# Patient Record
Sex: FEMALE | Race: WHITE | Employment: OTHER | URBAN - METROPOLITAN AREA
[De-identification: names, ages, dates, MRNs, and addresses within clinical notes are randomized per-mention and may not be internally consistent; named-entity substitution may affect disease eponyms.]

---

## 2017-01-09 RX ORDER — PREDNISONE 10 MG/1
TABLET ORAL
Qty: 40 TABLET | Refills: 1 | Status: SHIPPED | OUTPATIENT
Start: 2017-01-09 | End: 2017-01-24 | Stop reason: ALTCHOICE

## 2017-01-24 ENCOUNTER — OFFICE VISIT (OUTPATIENT)
Dept: INTERNAL MEDICINE CLINIC | Facility: CLINIC | Age: 67
End: 2017-01-24

## 2017-01-24 VITALS
DIASTOLIC BLOOD PRESSURE: 80 MMHG | SYSTOLIC BLOOD PRESSURE: 120 MMHG | WEIGHT: 170 LBS | HEIGHT: 61.5 IN | BODY MASS INDEX: 31.69 KG/M2 | HEART RATE: 76 BPM | TEMPERATURE: 97 F

## 2017-01-24 DIAGNOSIS — E78.00 PURE HYPERCHOLESTEROLEMIA: ICD-10-CM

## 2017-01-24 DIAGNOSIS — R56.9 SEIZURE (HCC): ICD-10-CM

## 2017-01-24 DIAGNOSIS — I10 ESSENTIAL HYPERTENSION, BENIGN: Primary | ICD-10-CM

## 2017-01-24 PROCEDURE — 99214 OFFICE O/P EST MOD 30 MIN: CPT | Performed by: INTERNAL MEDICINE

## 2017-01-24 PROCEDURE — 99212 OFFICE O/P EST SF 10 MIN: CPT | Performed by: INTERNAL MEDICINE

## 2017-01-24 NOTE — PROGRESS NOTES
HPI:    Patient ID: Debo Guaman is a 78year old female. Hypertension  This is a chronic problem. The current episode started more than 1 year ago. The problem is unchanged. The problem is controlled.  Pertinent negatives include no anxiety, blurred v numbness and headaches.             Current Outpatient Prescriptions:  PROAIR  (90 BASE) MCG/ACT Inhalation Aero Soln INHALE 2 PUFFS BY MOUTH EVERY 4 TO 6 HOURS AS NEEDED FOR WHEEZING Disp: 8.5 Inhaler Rfl: 11   HYDROCHLOROTHIAZIDE 12.5 MG Oral Cap T heard.  Pulmonary/Chest: Effort normal and breath sounds normal. No respiratory distress. She has no wheezes. She has no rales. She exhibits no tenderness. Abdominal: She exhibits no distension and no mass. There is no tenderness.  There is no rebound and

## 2017-02-07 RX ORDER — LIDOCAINE 50 MG/G
PATCH TOPICAL
Qty: 30 PATCH | Refills: 11 | Status: SHIPPED | OUTPATIENT
Start: 2017-02-07 | End: 2017-12-01

## 2017-03-20 ENCOUNTER — TELEPHONE (OUTPATIENT)
Dept: INTERNAL MEDICINE CLINIC | Facility: CLINIC | Age: 67
End: 2017-03-20

## 2017-03-20 RX ORDER — AMOXICILLIN AND CLAVULANATE POTASSIUM 875; 125 MG/1; MG/1
1 TABLET, FILM COATED ORAL 2 TIMES DAILY
Qty: 20 TABLET | Refills: 0 | Status: SHIPPED | OUTPATIENT
Start: 2017-03-20 | End: 2017-12-26

## 2017-03-20 NOTE — TELEPHONE ENCOUNTER
After her vacation, legs were very sunburned and dry, she has been constantly scratching and developed a lot of  Red Raised  Bumps on both of her legs.  Is their any kind of antibiotic cream she can apply to get some relief ???      [address]

## 2017-05-01 RX ORDER — LEVOFLOXACIN 500 MG/1
TABLET, FILM COATED ORAL
Qty: 10 TABLET | Refills: 0 | Status: SHIPPED | OUTPATIENT
Start: 2017-05-01 | End: 2017-05-02 | Stop reason: ALTCHOICE

## 2017-05-01 RX ORDER — ALPRAZOLAM 0.25 MG/1
TABLET ORAL
Qty: 270 TABLET | Refills: 1 | Status: SHIPPED
Start: 2017-05-01 | End: 2017-05-11

## 2017-05-01 RX ORDER — PREDNISONE 10 MG/1
TABLET ORAL
Qty: 40 TABLET | Refills: 1 | Status: SHIPPED | OUTPATIENT
Start: 2017-05-01 | End: 2017-05-02 | Stop reason: ALTCHOICE

## 2017-05-02 ENCOUNTER — OFFICE VISIT (OUTPATIENT)
Dept: INTERNAL MEDICINE CLINIC | Facility: CLINIC | Age: 67
End: 2017-05-02

## 2017-05-02 VITALS
HEIGHT: 61.5 IN | HEART RATE: 64 BPM | TEMPERATURE: 97 F | DIASTOLIC BLOOD PRESSURE: 80 MMHG | SYSTOLIC BLOOD PRESSURE: 140 MMHG

## 2017-05-02 DIAGNOSIS — R56.9 SEIZURE (HCC): ICD-10-CM

## 2017-05-02 DIAGNOSIS — J44.1 CHRONIC OBSTRUCTIVE ASTHMA WITH EXACERBATION (HCC): ICD-10-CM

## 2017-05-02 DIAGNOSIS — E78.00 PURE HYPERCHOLESTEROLEMIA: ICD-10-CM

## 2017-05-02 DIAGNOSIS — J45.901 CHRONIC OBSTRUCTIVE ASTHMA WITH EXACERBATION (HCC): ICD-10-CM

## 2017-05-02 DIAGNOSIS — I10 ESSENTIAL HYPERTENSION, BENIGN: Primary | ICD-10-CM

## 2017-05-02 PROCEDURE — 36415 COLL VENOUS BLD VENIPUNCTURE: CPT | Performed by: INTERNAL MEDICINE

## 2017-05-02 PROCEDURE — 99214 OFFICE O/P EST MOD 30 MIN: CPT | Performed by: INTERNAL MEDICINE

## 2017-05-02 NOTE — PROGRESS NOTES
HPI:    Patient ID: Syl Oliveira is a 78year old female. Hypertension  This is a chronic problem. The current episode started more than 1 year ago. The problem is unchanged. The problem is controlled.  Pertinent negatives include no anxiety, blurred v asymmetry, weakness, light-headedness, numbness and headaches.             Current Outpatient Prescriptions:  ALPRAZOLAM 0.25 MG Oral Tab TAKE 1 TABLET BY MOUTH EVERY MORNING AND 2 TABS EVERY EVENING Disp: 270 tablet Rfl: 1   LIDOCAINE 5 % External Patch AP reveals no gallop. No murmur heard. Pulmonary/Chest: Effort normal and breath sounds normal. No respiratory distress. She has no wheezes. She has no rales. She exhibits no tenderness. Abdominal: She exhibits no distension and no mass.  There is no ten

## 2017-05-11 RX ORDER — ALPRAZOLAM 0.25 MG/1
TABLET ORAL
Qty: 270 TABLET | Refills: 1 | Status: SHIPPED
Start: 2017-05-11 | End: 2017-10-18

## 2017-05-11 NOTE — TELEPHONE ENCOUNTER
Patient did not  previous refill at [facility], confirmed with pharmacy

## 2017-05-22 ENCOUNTER — TELEPHONE (OUTPATIENT)
Dept: INTERNAL MEDICINE CLINIC | Facility: CLINIC | Age: 67
End: 2017-05-22

## 2017-05-22 RX ORDER — INHALER, ASSIST DEVICES
SPACER (EA) MISCELLANEOUS
Qty: 1 EACH | Refills: 0 | Status: SHIPPED | OUTPATIENT
Start: 2017-05-22 | End: 2017-07-05

## 2017-05-22 NOTE — TELEPHONE ENCOUNTER
Patient would like a spacer for her inhaler, she thinks it will help her with using the inhaler, she needs a prescription, send to [facility]

## 2017-06-12 RX ORDER — VENLAFAXINE HYDROCHLORIDE 150 MG/1
CAPSULE, EXTENDED RELEASE ORAL
Qty: 90 CAPSULE | Refills: 3 | Status: SHIPPED | OUTPATIENT
Start: 2017-06-12 | End: 2018-05-28

## 2017-06-19 RX ORDER — PHENYTOIN SODIUM 100 MG/1
CAPSULE, EXTENDED RELEASE ORAL
Qty: 270 CAPSULE | Refills: 3 | Status: SHIPPED | OUTPATIENT
Start: 2017-06-19 | End: 2018-05-28

## 2017-06-22 ENCOUNTER — TELEPHONE (OUTPATIENT)
Dept: INTERNAL MEDICINE CLINIC | Facility: CLINIC | Age: 67
End: 2017-06-22

## 2017-06-22 RX ORDER — PREDNISONE 10 MG/1
TABLET ORAL
Qty: 60 TABLET | Refills: 2 | Status: ON HOLD | OUTPATIENT
Start: 2017-06-22 | End: 2017-09-25

## 2017-07-06 RX ORDER — CLOPIDOGREL BISULFATE 75 MG/1
TABLET ORAL
Qty: 90 TABLET | Refills: 3 | Status: SHIPPED | OUTPATIENT
Start: 2017-07-06 | End: 2018-06-28

## 2017-07-06 RX ORDER — INHALER, ASSIST DEVICES
SPACER (EA) MISCELLANEOUS
Qty: 1 DEVICE | Refills: 0 | Status: SHIPPED | OUTPATIENT
Start: 2017-07-06 | End: 2017-12-26

## 2017-08-25 ENCOUNTER — OFFICE VISIT (OUTPATIENT)
Dept: INTERNAL MEDICINE CLINIC | Facility: CLINIC | Age: 67
End: 2017-08-25

## 2017-08-25 VITALS
DIASTOLIC BLOOD PRESSURE: 76 MMHG | HEIGHT: 61.5 IN | TEMPERATURE: 97 F | HEART RATE: 72 BPM | SYSTOLIC BLOOD PRESSURE: 130 MMHG

## 2017-08-25 DIAGNOSIS — J45.901 CHRONIC OBSTRUCTIVE ASTHMA WITH EXACERBATION (HCC): ICD-10-CM

## 2017-08-25 DIAGNOSIS — J44.1 CHRONIC OBSTRUCTIVE ASTHMA WITH EXACERBATION (HCC): ICD-10-CM

## 2017-08-25 DIAGNOSIS — R56.9 SEIZURE (HCC): ICD-10-CM

## 2017-08-25 DIAGNOSIS — E78.00 PURE HYPERCHOLESTEROLEMIA: ICD-10-CM

## 2017-08-25 DIAGNOSIS — M41.86 OTHER FORM OF SCOLIOSIS OF LUMBAR SPINE: ICD-10-CM

## 2017-08-25 DIAGNOSIS — I10 ESSENTIAL HYPERTENSION, BENIGN: Primary | ICD-10-CM

## 2017-08-25 PROBLEM — M41.9 SCOLIOSIS: Status: ACTIVE | Noted: 2017-08-25

## 2017-08-25 PROCEDURE — 99212 OFFICE O/P EST SF 10 MIN: CPT | Performed by: INTERNAL MEDICINE

## 2017-08-25 PROCEDURE — 36415 COLL VENOUS BLD VENIPUNCTURE: CPT | Performed by: INTERNAL MEDICINE

## 2017-08-25 PROCEDURE — 99214 OFFICE O/P EST MOD 30 MIN: CPT | Performed by: INTERNAL MEDICINE

## 2017-08-25 RX ORDER — MELOXICAM 15 MG/1
15 TABLET ORAL DAILY
Qty: 30 TABLET | Refills: 3 | Status: SHIPPED | OUTPATIENT
Start: 2017-08-25 | End: 2017-10-10

## 2017-08-25 NOTE — PROGRESS NOTES
HPI:    Patient ID: Isaac Manuel is a 75year old female. Hypertension   This is a chronic problem. The current episode started more than 1 year ago. The problem is unchanged. The problem is controlled.  Pertinent negatives include no anxiety, blurred stiffness. Allergic/Immunologic: Negative for immunocompromised state. Neurological: Negative for dizziness, syncope, facial asymmetry, weakness, light-headedness, numbness and headaches.             Current Outpatient Prescriptions:  Meloxicam 15 MG Or reaction(s): Anaphylaxis   PHYSICAL EXAM:   Physical Exam   Constitutional: She is oriented to person, place, and time. She appears well-developed.    obesity   HENT:   Mouth/Throat: Oropharynx is clear and moist.   Eyes: EOM are normal. Pupils are equal, r

## 2017-08-26 LAB
ABSOLUTE BASOPHILS: 31 CELLS/UL (ref 0–200)
ABSOLUTE EOSINOPHILS: 62 CELLS/UL (ref 15–500)
ABSOLUTE LYMPHOCYTES: 739 CELLS/UL (ref 850–3900)
ABSOLUTE MONOCYTES: 416 CELLS/UL (ref 200–950)
ABSOLUTE NEUTROPHILS: 6453 CELLS/UL (ref 1500–7800)
ALBUMIN/GLOBULIN RATIO: 1.6 (CALC) (ref 1–2.5)
ALBUMIN: 4.2 G/DL (ref 3.6–5.1)
ALKALINE PHOSPHATASE: 106 U/L (ref 33–130)
ALT: 17 U/L (ref 6–29)
AST: 14 U/L (ref 10–35)
BASOPHILS: 0.4 %
BILIRUBIN, TOTAL: 0.5 MG/DL (ref 0.2–1.2)
BUN: 15 MG/DL (ref 7–25)
CALCIUM: 9.6 MG/DL (ref 8.6–10.4)
CARBON DIOXIDE: 25 MMOL/L (ref 20–31)
CHLORIDE: 102 MMOL/L (ref 98–110)
CHOL/HDLC RATIO: 2.3 (CALC)
CHOLESTEROL, TOTAL: 249 MG/DL
CREATININE: 0.51 MG/DL (ref 0.5–0.99)
EGFR IF AFRICN AM: 115 ML/MIN/1.73M2
EGFR IF NONAFRICN AM: 99 ML/MIN/1.73M2
EOSINOPHILS: 0.8 %
GLOBULIN: 2.6 G/DL (CALC) (ref 1.9–3.7)
GLUCOSE: 102 MG/DL (ref 65–99)
HDL CHOLESTEROL: 106 MG/DL
HEMATOCRIT: 39.1 % (ref 35–45)
HEMOGLOBIN: 13.7 G/DL (ref 11.7–15.5)
LDL-CHOLESTEROL: 125 MG/DL (CALC)
LYMPHOCYTES: 9.6 %
MCH: 31.6 PG (ref 27–33)
MCHC: 35 G/DL (ref 32–36)
MCV: 90.1 FL (ref 80–100)
MONOCYTES: 5.4 %
MPV: 9.4 FL (ref 7.5–12.5)
NEUTROPHILS: 83.8 %
NON-HDL CHOLESTEROL: 143 MG/DL (CALC)
PHENYTOIN: 6.7 MG/L (ref 10–20)
PLATELET COUNT: 300 THOUSAND/UL (ref 140–400)
POTASSIUM: 4.2 MMOL/L (ref 3.5–5.3)
PROTEIN, TOTAL: 6.8 G/DL (ref 6.1–8.1)
RDW: 12.9 % (ref 11–15)
RED BLOOD CELL COUNT: 4.34 MILLION/UL (ref 3.8–5.1)
SODIUM: 138 MMOL/L (ref 135–146)
TRIGLYCERIDES: 85 MG/DL
WHITE BLOOD CELL COUNT: 7.7 THOUSAND/UL (ref 3.8–10.8)

## 2017-09-15 ENCOUNTER — TELEPHONE (OUTPATIENT)
Dept: INTERNAL MEDICINE CLINIC | Facility: CLINIC | Age: 67
End: 2017-09-15

## 2017-09-15 ENCOUNTER — OFFICE VISIT (OUTPATIENT)
Dept: INTERNAL MEDICINE CLINIC | Facility: CLINIC | Age: 67
End: 2017-09-15

## 2017-09-15 VITALS
SYSTOLIC BLOOD PRESSURE: 124 MMHG | TEMPERATURE: 98 F | HEART RATE: 72 BPM | HEIGHT: 61.5 IN | DIASTOLIC BLOOD PRESSURE: 70 MMHG

## 2017-09-15 DIAGNOSIS — J44.1 ACUTE EXACERBATION OF CHRONIC OBSTRUCTIVE PULMONARY DISEASE (COPD) (HCC): Primary | ICD-10-CM

## 2017-09-15 PROCEDURE — 99212 OFFICE O/P EST SF 10 MIN: CPT | Performed by: INTERNAL MEDICINE

## 2017-09-15 PROCEDURE — 99214 OFFICE O/P EST MOD 30 MIN: CPT | Performed by: INTERNAL MEDICINE

## 2017-09-15 RX ORDER — NEBULIZER ACCESSORIES
KIT MISCELLANEOUS
Qty: 1 KIT | Refills: 3 | Status: SHIPPED | OUTPATIENT
Start: 2017-09-15 | End: 2020-03-13

## 2017-09-15 RX ORDER — IPRATROPIUM BROMIDE AND ALBUTEROL SULFATE 2.5; .5 MG/3ML; MG/3ML
3 SOLUTION RESPIRATORY (INHALATION) EVERY 6 HOURS PRN
Qty: 360 VIAL | Refills: 3 | Status: SHIPPED | OUTPATIENT
Start: 2017-09-15 | End: 2017-09-29

## 2017-09-15 RX ORDER — MONTELUKAST SODIUM 10 MG/1
10 TABLET ORAL DAILY
Qty: 90 TABLET | Refills: 3 | Status: SHIPPED | OUTPATIENT
Start: 2017-09-15 | End: 2018-08-11

## 2017-09-15 NOTE — PROGRESS NOTES
HPI:    Patient ID: Lisa Tai is a 75year old female. Patient has been on Steroids since early August , was on 50 mg tapering every 4 days and now 40 mg tapering   She gets relief with proair 2 times a day .        Breathing Problem   She complains state.   Neurological: Negative for dizziness, syncope, facial asymmetry, weakness, light-headedness, numbness and headaches.             Current Outpatient Prescriptions:  Montelukast Sodium (SINGULAIR) 10 MG Oral Tab Take 1 tablet (10 mg total) by mouth d HOLDING CHAMBER Does not apply Device USE AS DIRECTED WITH PROAIR INHALER Disp: 1 Device Rfl: 0   PROAIR  (90 Base) MCG/ACT Inhalation Aero Soln INHALE 2 PUFFS BY MOUTH EVERY 4 TO 6 HOURS AS NEEDED Disp: 8.5 g Rfl: 11   LIDOCAINE 5 % External Patch Montelukast Sodium (SINGULAIR) 10 MG Oral Tab 90 tablet 3      Sig: Take 1 tablet (10 mg total) by mouth daily. Linaclotide (LINZESS) 145 MCG Oral Cap 30 capsule 5      Sig: Take 145 mcg by mouth daily.       ipratropium-albuterol 0.5-2.5 (3) MG/3ML In

## 2017-09-25 ENCOUNTER — APPOINTMENT (OUTPATIENT)
Dept: GENERAL RADIOLOGY | Facility: HOSPITAL | Age: 67
DRG: 192 | End: 2017-09-25
Attending: EMERGENCY MEDICINE
Payer: COMMERCIAL

## 2017-09-25 ENCOUNTER — HOSPITAL ENCOUNTER (INPATIENT)
Facility: HOSPITAL | Age: 67
LOS: 4 days | Discharge: HOME | DRG: 192 | End: 2017-09-29
Attending: EMERGENCY MEDICINE | Admitting: INTERNAL MEDICINE
Payer: COMMERCIAL

## 2017-09-25 DIAGNOSIS — J44.1 COPD EXACERBATION (HCC): Primary | ICD-10-CM

## 2017-09-25 PROCEDURE — 99223 1ST HOSP IP/OBS HIGH 75: CPT | Performed by: INTERNAL MEDICINE

## 2017-09-25 PROCEDURE — 71010 XR CHEST AP PORTABLE  (CPT=71010): CPT | Performed by: EMERGENCY MEDICINE

## 2017-09-25 RX ORDER — METHYLPREDNISOLONE SODIUM SUCCINATE 125 MG/2ML
125 INJECTION, POWDER, LYOPHILIZED, FOR SOLUTION INTRAMUSCULAR; INTRAVENOUS ONCE
Status: COMPLETED | OUTPATIENT
Start: 2017-09-25 | End: 2017-09-25

## 2017-09-25 RX ORDER — PHENYTOIN SODIUM 100 MG/1
100 CAPSULE, EXTENDED RELEASE ORAL 3 TIMES DAILY
Status: DISCONTINUED | OUTPATIENT
Start: 2017-09-25 | End: 2017-09-29

## 2017-09-25 RX ORDER — LIDOCAINE 50 MG/G
1 PATCH TOPICAL DAILY
Status: DISCONTINUED | OUTPATIENT
Start: 2017-09-25 | End: 2017-09-29

## 2017-09-25 RX ORDER — METHYLPREDNISOLONE SODIUM SUCCINATE 40 MG/ML
40 INJECTION, POWDER, LYOPHILIZED, FOR SOLUTION INTRAMUSCULAR; INTRAVENOUS EVERY 6 HOURS
Status: DISCONTINUED | OUTPATIENT
Start: 2017-09-25 | End: 2017-09-26

## 2017-09-25 RX ORDER — SODIUM CHLORIDE 9 MG/ML
INJECTION, SOLUTION INTRAVENOUS
Status: COMPLETED
Start: 2017-09-25 | End: 2017-09-25

## 2017-09-25 RX ORDER — ATORVASTATIN CALCIUM 20 MG/1
20 TABLET, FILM COATED ORAL NIGHTLY
Status: DISCONTINUED | OUTPATIENT
Start: 2017-09-25 | End: 2017-09-29

## 2017-09-25 RX ORDER — ACETAMINOPHEN 325 MG/1
650 TABLET ORAL EVERY 6 HOURS PRN
Status: DISCONTINUED | OUTPATIENT
Start: 2017-09-25 | End: 2017-09-29

## 2017-09-25 RX ORDER — IPRATROPIUM BROMIDE AND ALBUTEROL SULFATE 2.5; .5 MG/3ML; MG/3ML
3 SOLUTION RESPIRATORY (INHALATION)
Status: DISCONTINUED | OUTPATIENT
Start: 2017-09-25 | End: 2017-09-27

## 2017-09-25 RX ORDER — SODIUM CHLORIDE 0.9 % (FLUSH) 0.9 %
3 SYRINGE (ML) INJECTION AS NEEDED
Status: DISCONTINUED | OUTPATIENT
Start: 2017-09-25 | End: 2017-09-29

## 2017-09-25 RX ORDER — ALBUTEROL SULFATE 90 UG/1
2 AEROSOL, METERED RESPIRATORY (INHALATION) 4 TIMES DAILY
Status: DISCONTINUED | OUTPATIENT
Start: 2017-09-25 | End: 2017-09-25

## 2017-09-25 RX ORDER — VALSARTAN 320 MG/1
320 TABLET ORAL
Status: DISCONTINUED | OUTPATIENT
Start: 2017-09-26 | End: 2017-09-29

## 2017-09-25 RX ORDER — HYDRALAZINE HYDROCHLORIDE 20 MG/ML
10 INJECTION INTRAMUSCULAR; INTRAVENOUS EVERY 4 HOURS PRN
Status: DISCONTINUED | OUTPATIENT
Start: 2017-09-25 | End: 2017-09-29

## 2017-09-25 RX ORDER — ALBUTEROL SULFATE 90 UG/1
2 AEROSOL, METERED RESPIRATORY (INHALATION) EVERY 4 HOURS PRN
Status: DISCONTINUED | OUTPATIENT
Start: 2017-09-25 | End: 2017-09-29

## 2017-09-25 RX ORDER — HYDROCHLOROTHIAZIDE 12.5 MG/1
12.5 CAPSULE, GELATIN COATED ORAL
Status: DISCONTINUED | OUTPATIENT
Start: 2017-09-26 | End: 2017-09-29

## 2017-09-25 RX ORDER — VENLAFAXINE HYDROCHLORIDE 150 MG/1
150 CAPSULE, EXTENDED RELEASE ORAL
Status: DISCONTINUED | OUTPATIENT
Start: 2017-09-26 | End: 2017-09-29

## 2017-09-25 RX ORDER — MONTELUKAST SODIUM 10 MG/1
10 TABLET ORAL DAILY
Status: DISCONTINUED | OUTPATIENT
Start: 2017-09-25 | End: 2017-09-29

## 2017-09-25 RX ORDER — IPRATROPIUM BROMIDE AND ALBUTEROL SULFATE 2.5; .5 MG/3ML; MG/3ML
3 SOLUTION RESPIRATORY (INHALATION) ONCE
Status: COMPLETED | OUTPATIENT
Start: 2017-09-25 | End: 2017-09-25

## 2017-09-25 RX ORDER — CLOPIDOGREL BISULFATE 75 MG/1
75 TABLET ORAL
Status: DISCONTINUED | OUTPATIENT
Start: 2017-09-26 | End: 2017-09-29

## 2017-09-25 RX ORDER — 0.9 % SODIUM CHLORIDE 0.9 %
VIAL (ML) INJECTION
Status: DISPENSED
Start: 2017-09-25 | End: 2017-09-26

## 2017-09-25 RX ORDER — 0.9 % SODIUM CHLORIDE 0.9 %
VIAL (ML) INJECTION
Status: COMPLETED
Start: 2017-09-25 | End: 2017-09-25

## 2017-09-25 RX ORDER — IBUPROFEN 400 MG/1
800 TABLET ORAL 3 TIMES DAILY PRN
Status: DISCONTINUED | OUTPATIENT
Start: 2017-09-25 | End: 2017-09-29

## 2017-09-25 RX ORDER — CARVEDILOL 25 MG/1
25 TABLET ORAL 2 TIMES DAILY WITH MEALS
Status: DISCONTINUED | OUTPATIENT
Start: 2017-09-25 | End: 2017-09-29

## 2017-09-25 RX ORDER — HEPARIN SODIUM 5000 [USP'U]/ML
5000 INJECTION, SOLUTION INTRAVENOUS; SUBCUTANEOUS EVERY 12 HOURS SCHEDULED
Status: DISCONTINUED | OUTPATIENT
Start: 2017-09-25 | End: 2017-09-29

## 2017-09-25 RX ORDER — ALPRAZOLAM 0.25 MG/1
0.25 TABLET ORAL 2 TIMES DAILY PRN
Status: DISCONTINUED | OUTPATIENT
Start: 2017-09-25 | End: 2017-09-29

## 2017-09-25 NOTE — ED PROVIDER NOTES
Patient Seen in: [facility] AND [facility] Emergency Department     History   Patient presents with:  Dyspnea GUANACO SOB (respiratory)    Stated Complaint: sob    HPI    75year old female with a history of COPD on steroids for over a month now, having escalation of PHENYTOIN SODIUM EXTENDED 100 MG Oral Cap,  TAKE 1 CAPSULE BY MOUTH THREE TIMES DAILY   VENLAFAXINE HCL  MG Oral Capsule SR 24 Hr,  TAKE 1 CAPSULE BY MOUTH EVERY DAY   PROAIR  (90 Base) MCG/ACT Inhalation Aero Soln,  INHALE 2 PUFFS BY MOUTH EV (!) 88 %  O2 Device: None (Room air)    Current:/82   Pulse 74   Temp 98 °F (36.7 °C)   Resp 18   Ht 162.6 cm (5' 4\")   Wt 77.1 kg   SpO2 98%   BMI 29.18 kg/m²         Physical Exam   Constitutional: She is oriented to person, place, and time.  She i W/ DIFFERENTIAL - Abnormal; Notable for the following:     MCH 32.2 (*)     MPV 7.3 (*)     Lymphocyte Absolute 0.7 (*)     All other components within normal limits   CBC WITH DIFFERENTIAL WITH PLATELET    Narrative:      The following orders were created ED Medications Administered:   Medications   MethylPREDNISolone Sodium Succ (Solu-MEDROL) injection 125 mg (125 mg Intravenous Given 9/25/17 1009)   ipratropium-albuterol (DUONEB) nebulizer solution 3 mL (3 mL Nebulization Given 9/25/17 3401) Monitor Interpretation: normal sinus rhythm with a rate of 80    Oxygen Saturation: 98% on nasal cannula, abnormal: Hypoxic    Consults:         Consult to Family/Internal Medicine Once      IP consult to Pulmonology Once    MD Discussions or Sign-Ou

## 2017-09-25 NOTE — CONSULTS
[facility] HOSP - [facility]    Consult Note    Date:  9/25/2017  Date of Admission:  9/25/2017      Chief Complaint:   [place] Macias is a(n) 75year old female with dyspnea.     HPI:   The patient has a long-standing history of COPD having smoked 1 pack per 11/20/2014  Comment: 1 unit per day for 50 years  Alcohol use: Yes             Allergies/Medications: Allergies:   Lisinopril                  Comment:Other reaction(s): Cough  Shellfish Allergy           Comment:Other reaction(s):  Anaphylaxis    (Not in shot  7. To follow-up with pulmonary rehabilitation in the outpatient setting and patient will need a repeat spirometry prior    2. DVT prophylaxis–subcutaneous heparin    I am delighted to assist with Therese's care.         Chiki Conti MD  [facility]

## 2017-09-25 NOTE — ED INITIAL ASSESSMENT (HPI)
Patient here for c/o sob that started last night, took 2 nebs without relief. Hx COPD, SP02 88% on RA. Patient does not use home oxygen. Denies chest pain.

## 2017-09-26 PROCEDURE — 99222 1ST HOSP IP/OBS MODERATE 55: CPT | Performed by: INTERNAL MEDICINE

## 2017-09-26 PROCEDURE — 99232 SBSQ HOSP IP/OBS MODERATE 35: CPT | Performed by: INTERNAL MEDICINE

## 2017-09-26 RX ORDER — METHYLPREDNISOLONE SODIUM SUCCINATE 40 MG/ML
40 INJECTION, POWDER, LYOPHILIZED, FOR SOLUTION INTRAMUSCULAR; INTRAVENOUS EVERY 8 HOURS
Status: DISCONTINUED | OUTPATIENT
Start: 2017-09-26 | End: 2017-09-27

## 2017-09-26 RX ORDER — TRAMADOL HYDROCHLORIDE 50 MG/1
50 TABLET ORAL EVERY 6 HOURS PRN
Status: DISCONTINUED | OUTPATIENT
Start: 2017-09-26 | End: 2017-09-29

## 2017-09-26 NOTE — DISCHARGE PLANNING
9/26CM-MD orders received in regards to discharge planning-Patient will need home Oxygen.  This Writer to would leave a  DME Face to Face Form needs is in the Patient’s chart to be completed  and signed by the Patient’s MD and the oxygen sats need to be [place]

## 2017-09-26 NOTE — PAYOR COMM NOTE
--------------  ADMISSION REVIEW     Payor: [facility]  Subscriber #:  [id number]  Authorization Number: N/A    Admit date: 9/25/17  Admit time: 56       Admitting Physician: Dar Bang MD  Attending Physician:  Dar Bang MD  Primary Care Physic (NEBULIZER COMPRESSOR) Does not apply Kit,  Use every 6 hours as needed   Respiratory Therapy Supplies (NEBULIZER/TUBING/MOUTHPIECE) Does not apply Kit,  Use every 6 hours as needed   Meloxicam 15 MG Oral Tab,  Take 1 tablet (15 mg total) by mouth daily. expiration).             Emergency Differential Diagnosis: COPD exacerbation versus pneumothorax versus pneumonia    ED Course   Nursing notes and Triage vitals reviewed[JH.1]  Labs Reviewed   BASIC METABOLIC PANEL (8) - Abnormal; Notable for the following: hemidiaphragm. .  No significant pulmonary     parenchymal abnormalities. No effusion or pleural thickening. BONES: Moderately severe rotary thoracolumbar scoliosis. OTHER: Negative.            Dictated by (CST): Nadege Brown MD on 9/25/2017 at 10:0 obstructive pulmonary disease (COPD) ([facility] and COPD exacerbation ([facility] [address] on her problem list.[JH.2] These problems contribute to the complexity of this ED evaluation.     Radiology Interpretation: Radiology reports and images reviewed personally and are nega User    9/25/2017 7744 Given 0.25 mg Oral Albina Plascencia RN    9/25/2017 1509 Given 0.25 mg Oral Evelia Segal RN      atorvastatin (LIPITOR) tab 20 mg     Date Action Dose Route User    9/25/2017 2026 Given 20 mg Oral Mikal Crook RN carvedil 40 mg Intravenous Jen Conway RN    9/25/2017 1328 Given 40 mg Intravenous Jen Conway RN      Montelukast Sodium (SINGULAIR) tab 10 mg     Date Action Dose Route User    9/25/2017 1326 Given 10 mg Oral Jen Conway RN      Normal Saline Flush 0

## 2017-09-26 NOTE — PROGRESS NOTES
[facility]     Progress Note        Stacey Macias Patient Status:  Inpatient    1950 MRN [id number]   Location [facility] 3W/SW Attending Toshia White MD   Hosp Day # 1 PCP Modesta Maldonado MD       Subjective:   Patient seen tab 20 mg 20 mg Oral Nightly   valsartan (DIOVAN) tab 320 mg 320 mg Oral Daily   Venlafaxine HCl ER (EFFEXOR-XR) 24 hr cap 150 mg 150 mg Oral Daily   acetaminophen (TYLENOL) tab 650 mg 650 mg Oral Q6H PRN   hydrALAzine HCl (APRESOLINE) injection 10 mg 10 m observed  -Chest x-ray reviewed with no significant abnormality seen  -IV steroids wean. -ICS/LABA  -LAMA. Patient admits to using Spiriva at home but is unsure if she is using proper technique and concerned about drug delivery.   I recommended to the pat

## 2017-09-26 NOTE — H&P
[facility]    PATIENT'S NAME: MAY, [address]   ATTENDING PHYSICIAN: [place].  Jax Simpson MD   PATIENT ACCOUNT#:   [id number]    LOCATION:  [address] [place] [address] #:   [id number]       YOB: 1950  ADMISSION DATE:       09/25/2017 part-time but will be retiring from that job as well. FAMILY HISTORY:  Father had a CVA with dementia, hyperlipidemia, CHF,  in his mid 80s. Mother had cancer. She had a son who committed suicide.   Another son who had coronary artery disease, ob Patient is somewhat anxious. She is on IV steroids, not tearful, not overtly depressed. NEUROLOGIC:  Cranial nerves II-XII grossly intact. Patient ambulates without assistance. MUSCULOSKELETAL:  Strength 5/5 in all 4 extremities.   Deep tendon reflexes

## 2017-09-27 PROCEDURE — 99232 SBSQ HOSP IP/OBS MODERATE 35: CPT | Performed by: INTERNAL MEDICINE

## 2017-09-27 RX ORDER — PREDNISONE 20 MG/1
60 TABLET ORAL
Status: DISCONTINUED | OUTPATIENT
Start: 2017-09-28 | End: 2017-09-29

## 2017-09-27 RX ORDER — IPRATROPIUM BROMIDE AND ALBUTEROL SULFATE 2.5; .5 MG/3ML; MG/3ML
3 SOLUTION RESPIRATORY (INHALATION)
Status: DISCONTINUED | OUTPATIENT
Start: 2017-09-27 | End: 2017-09-29

## 2017-09-27 RX ORDER — IPRATROPIUM BROMIDE AND ALBUTEROL SULFATE 2.5; .5 MG/3ML; MG/3ML
3 SOLUTION RESPIRATORY (INHALATION) EVERY 6 HOURS PRN
Status: DISCONTINUED | OUTPATIENT
Start: 2017-09-27 | End: 2017-09-29

## 2017-09-27 NOTE — PROGRESS NOTES
[facility]    Progress Note    Sebas Johnstontrisha Macias Patient Status:  Inpatient    1950 MRN [id number]   Location [facility] 3W/SW Attending Gentry Brush MD   Hosp Day # 2 PCP Sarthak Pedro MD     Subjective:     Constitutional: murmur heard. Edema not present. Carotid bruit not present. Pulmonary/Chest: Effort normal. No respiratory distress. She has no wheezes. She has no rales. DIMINISHED BREATH SOUND NO AUDIBLE WHEEZING TODAY . Abdominal: Soft.  Bowel sounds are normal. -Left atrial enlargement. -ST depression + T-abnormality -Nondiagnostic.  ABNORMAL When compared with ECG of 08/26/2012 17:14:23 anterior T waves have changed, significant changes have occurred Electronically signed on 09/25/2017 at 10:01 by Karime Herr MD

## 2017-09-27 NOTE — PROGRESS NOTES
Pulmonary/Critical Care Follow Up Note    HPI:   Yaz Macias is a 75year old female with Patient presents with:  Dyspnea GUANACO SOB (respiratory)      PCP Bernard Cody MD  Admission Attending Sugey Aguilar 15 Day #2    No sob  No wheeze  Feel Clopidogrel Bisulfate (PLAVIX) tab 75 mg, 75 mg, Oral, Daily  •  hydrochlorothiazide (MICROZIDE) cap 12.5 mg, 12.5 mg, Oral, Daily  •  Phenytoin Sodium Extended (DILANTIN) ER cap 100 mg, 100 mg, Oral, TID  •  atorvastatin (LIPITOR) tab 20 mg, 20 mg, Oral, encouragement      Shelley Shahid MD

## 2017-09-27 NOTE — PLAN OF CARE
Patient/Family Goals    • Patient/Family Long Term Goal    RETURN TO NORMAL ACTIVITIES  Progressing    • Patient/Family Short Term Goal    OFF FROM OXYGEN Progressing        RESPIRATORY - ADULT    • Achieves optimal ventilation and oxygenation    OXYGEN 2L

## 2017-09-27 NOTE — PHYSICAL THERAPY NOTE
PHYSICAL THERAPY EVALUATION - INPATIENT     Room Number: 327/327-A  Evaluation Date: 9/27/2017  Type of Evaluation: Initial  Physician Order: PT Eval and Treat    Presenting Problem: COPD  Reason for Therapy: Mobility Dysfunction and Discharge Planning unspecified hyperlipidemia    • Other ill-defined conditions(799.89)     CEA 8/12   • Scoliosis     Severe   • Tobacco abuse    • Unspecified essential hypertension     SZ \"due to high blood pressure\", 2012       Past Surgical History  History reviewed. to a chair (including a wheelchair)?: None   -   Need to walk in hospital room?: A Little   -   Climbing 3-5 steps with a railing?: A Little     AM-PAC Score:  Raw Score: 22   PT Approx Degree of Impairment Score: 20.91%   Standardized Score (AM-PAC Scale)

## 2017-09-28 PROCEDURE — 99232 SBSQ HOSP IP/OBS MODERATE 35: CPT | Performed by: INTERNAL MEDICINE

## 2017-09-28 RX ORDER — DOCUSATE SODIUM 100 MG/1
100 CAPSULE, LIQUID FILLED ORAL 2 TIMES DAILY
Status: DISCONTINUED | OUTPATIENT
Start: 2017-09-28 | End: 2017-09-28

## 2017-09-28 RX ORDER — 0.9 % SODIUM CHLORIDE 0.9 %
VIAL (ML) INJECTION
Status: COMPLETED
Start: 2017-09-28 | End: 2017-09-28

## 2017-09-28 RX ORDER — PANTOPRAZOLE SODIUM 40 MG/1
40 TABLET, DELAYED RELEASE ORAL
Status: DISCONTINUED | OUTPATIENT
Start: 2017-09-28 | End: 2017-09-29

## 2017-09-28 RX ORDER — POTASSIUM CHLORIDE 20 MEQ/1
40 TABLET, EXTENDED RELEASE ORAL EVERY 4 HOURS
Status: COMPLETED | OUTPATIENT
Start: 2017-09-28 | End: 2017-09-28

## 2017-09-28 RX ORDER — CEFUROXIME AXETIL 500 MG/1
500 TABLET ORAL EVERY 12 HOURS SCHEDULED
Status: DISCONTINUED | OUTPATIENT
Start: 2017-09-28 | End: 2017-09-29

## 2017-09-28 RX ORDER — DOCUSATE SODIUM 100 MG/1
100 CAPSULE, LIQUID FILLED ORAL 2 TIMES DAILY
Status: DISCONTINUED | OUTPATIENT
Start: 2017-09-28 | End: 2017-09-29

## 2017-09-28 NOTE — PROGRESS NOTES
Pulmonary/Critical Care Follow Up Note    HPI:   Yaz Macias is a 75year old female with Patient presents with:  Dyspnea GUANACO SOB (respiratory)      PCP Bernard Cody MD  Admission Attending Bernard Coyd MD    Hospital Day #3      Anxious  Feels like n (COREG) tab 25 mg, 25 mg, Oral, BID with meals  •  Clopidogrel Bisulfate (PLAVIX) tab 75 mg, 75 mg, Oral, Daily  •  hydrochlorothiazide (MICROZIDE) cap 12.5 mg, 12.5 mg, Oral, Daily  •  Phenytoin Sodium Extended (DILANTIN) ER cap 100 mg, 100 mg, Oral, TID Prior nicotine dependence  Counseling and encouragement    Dispo- would likely tolerated d/c to home  However need to consider pt fear as will bouce back if fells like d/c too early      Jamar Regalado MD

## 2017-09-28 NOTE — PROGRESS NOTES
[facility]    Progress Note    Ebbie Skains May Patient Status:  Inpatient    1950 MRN [id number]   Location [facility] 3W/SW Attending Marilu Verma MD   Hosp Day # 3 PCP Le Meadows MD       SUBJECTIVE:  Breathing better. Oral Q6H PRN   Fluticasone Furoate-Vilanterol (BREO ELLIPTA) 100-25 MCG/INH inhaler 1 puff 1 puff Inhalation Daily   Montelukast Sodium (SINGULAIR) tab 10 mg 10 mg Oral Daily   Umeclidinium Bromide (INCRUSE ELLIPTA) 62.5 MCG/INH inhaler 1 puff 1 puff Inhal PO    GERD  Start PPI    Constipation  Bowel protocol. High fiber diet. Anxiety/Grief reaction  Still grieving from son's passing. HTN  BP controlled.     Home 02 eval    Seizure disorder  On Dilantin        Questions/concerns were discussed with diogenes

## 2017-09-28 NOTE — PLAN OF CARE
RESPIRATORY - ADULT    • Achieves optimal ventilation and oxygenation Not Progressing          Ambulatory O2 sats assessed for home oxygen need. On 2L at rest she is 97%. Pt ambulated approximately 30 feet on room air. O2 sats dropped to 86%.  Nasal cannula

## 2017-09-28 NOTE — PLAN OF CARE
Rested through the night, headache, prn tramadol given, oral steroid in the am. Will continue to monitor

## 2017-09-28 NOTE — PAYOR COMM NOTE
--------------  ADMISSION REVIEW     Payor: [facility]  Subscriber #:  [id number]  Authorization Number: [id number]    Admit date: 9/25/17  Admit time: [address]       Admitting Physician: Dar Bang MD  Attending Physician:  Dar Bang MD  Primary Care ipratropium-albuterol 0.5-2.5 (3) MG/3ML Inhalation Solution,  Take 3 mL by nebulization every 6 (six) hours as needed.    Respiratory Therapy Supplies (NEBULIZER COMPRESSOR) Does not apply Kit,  Use every 6 hours as needed   Respiratory Therapy Supplies ( Cancer-colon, Grandfather       Smoking status: Former Smoker                                                              Packs/day: 0.00      Years: 0.00         Types: Cigarettes     Quit date: 11/20/2014  Comment: 1 unit per day for 50 years  Alcohol Musculoskeletal: Normal range of motion. She exhibits no edema. Neurological: She is alert and oriented to person, place, and time. She displays no seizure activity. Skin: Skin is intact. No petechiae noted. She is not diaphoretic. No cyanosis.  No pall INDICATIONS: Shortness of breath x1 day. History of COPD and previous     smoker. TECHNIQUE:   Single view. FINDINGS:     CARDIAC/VASC: Moderate tortuosity ascending and descending thoracic     aorta. .  No cardiac silhouette abnormality Medical Record Review: I personally reviewed available prior medical records for any recent pertinent discharge summaries, testing, and procedures and reviewed those reports. Complicating Factors:  The patient already has[JH.1] has Chronic obstructive a COPD exacerbation ([facility] [address]  (primary encounter diagnosis)[JH.2]    Disposition:[JH.1]  There is no disposition on file for this visit. [JH.2]    Follow-up:[JH.1]  No follow-up provider specified. [JH.2]    Medications Prescribed:[JH.1]  Current Discharge [facility] HISTORY OF PRESENT ILLNESS:  She is a 79-year-old female with past medical history of COPD who had quit smoking approximately 2 years ago but smoked more than a pack a day for more than 50 years.   The patient stated that since August her breathing has been REVIEW OF SYSTEMS:  The patient complains of poor sleep, anxiety, depression, controlled on current medicines. She lives alone. Denies any visual changes, dry mouth, swallowing difficulties, neck pain, chest pain. No abdominal pain.   No nausea, vomiting LABORATORY DATA:  Sodium 131, potassium 3.6, chloride 96, bicarb 28, BUN 13, creatinine 0.58, blood sugar 116.  _______ 10, WBC 12.9, hemoglobin 13.9, hematocrit 41.1.       Chest x-ray showed some atelectasis, no overt pneumonia, and changes consistent wit CefTRIAXone Sodium (ROCEPHIN) 1 g in sodium chloride 0.9 % 100 mL IVPB-minibag/addvantage     Date Action Dose Route User    9/28/2017 1339 New Bag 1 g Intravenous Ifeoma Ramos, RN      Clopidogrel Bisulfate (PLAVIX) tab 75 mg     Date Action Dose Route 9/28/2017 0802 Given 50 mg Oral Melissa Hannater Nymirum    9/28/2017 0121 Given 50 mg Oral Naa Martell, RN      Umeclidinium Bromide (INCRUSE ELLIPTA) 62.5 MCG/INH inhaler 1 puff     Date Action Dose Route User    9/28/2017 5505 Given 1 puff I • CVA (cerebral infarction)       CVA by CT only   • Depression     • Esophageal reflux     • Hemorrhoids     • Hepatitis B 1993   • Other and unspecified hyperlipidemia     • Other ill-defined conditions(799.89)       CEA 8/12   • Scoliosis       Severe Extremities without clubbing cyanosis nor edema. Neurologic grossly intact with symmetric tone and strength and reflex.   Skin unremarkable     Results:      Lab Results  Component Value Date   WBC 8.4 09/25/2017   HGB 13.9 09/25/2017   HCT 39.8 09/25/201    Objective:   Blood pressure (!) 164/90, pulse 84, temperature 98.4 °F (36.9 °C), temperature source Oral, resp. rate 18, height 5' 4\" (1.626 m), weight 185 lb 9.6 oz (84.2 kg), SpO2 96 %.   Intake/Output:                 Last 3 shifts: I/O last 3 comple acetaminophen (TYLENOL) tab 650 mg 650 mg Oral Q6H PRN   Albuterol Sulfate  (90 Base) MCG/ACT inhaler 2 puff 2 puff Inhalation Q4H PRN   ibuprofen (MOTRIN) tab 800 mg 800 mg Oral TID PRN         Continuous Infusions:       Physical Exam  Constitutio -LAMA. Patient admits to using Spiriva at home but is unsure if she is using proper technique and concerned about drug delivery.   I recommended to the patient that she may try Spiriva Respimat when she needs to fill her next prescription.  -Empiric ceftri Neurological: Negative for dizziness, weakness, numbness and headaches. Psychiatric/Behavioral: Positive for depressed mood. Negative for behavioral problems, confusion and sleep disturbance. The patient is nervous/anxious.          PATIENT MORE ANXIOUS T PATIENT IS IMPROVING, STILL DECREASED AIR FLOW, PATIENT IS ANXIOUS AND THIS ALSO  PLAYS A ROLE WITH HER RESPIRATORY DISTRESS AT HOME. MORE ANXIOUS TODAY , STEROID EFFECT. TEARFUL. CONCERNED ABOUT GOING HOME AND HAVING THE SAME THING HAPPEN.    PATIENT HA Author: Jose Olson MD Service: Pulmonology Author Type: Physician   Filed: 9/27/2017  5:16 PM Date of Service: 9/27/2017  5:15 PM Status: Signed   : Jose Olson MD (Physician)   []Manual[]Template  []Copied  Pulmonary/Critical Care Follow Up •  CefTRIAXone Sodium (ROCEPHIN) 1 g in sodium chloride 0.9 % 100 mL IVPB-minibag/addvantage, 1 g, Intravenous, Q24H  •  lidocaine (LIDODERM) 5 % 1 patch, 1 patch, Transdermal, Daily  •  ALPRAZolam (XANAX) tab 0.25 mg, 0.25 mg, Oral, BID PRN  •  carvedilol CA 9.4 09/27/2017               ASSESSMENT/PLAN:      1.   COPD with acute exacerbation  Feeling better  Lung clear  Anxiety and panic may have played a role  Plan wean steroids, po in AM                 Nebs                 ICS/LABA                 LAMA •  predniSONE (DELTASONE) tab 60 mg, 60 mg, Oral, Daily with breakfast  •  TraMADol HCl (ULTRAM) tab 50 mg, 50 mg, Oral, Q6H PRN  •  Fluticasone Furoate-Vilanterol (BREO ELLIPTA) 100-25 MCG/INH inhaler 1 puff, 1 puff, Inhalation, Daily  •  Montelukast Sodi Blood pressure 129/82, pulse 73, temperature 98.9 °F (37.2 °C), temperature source Oral, resp.  rate 16, height 5' 4\" (1.626 m), weight 182 lb 6.4 oz (82.7 kg), SpO2 98 %.     Intake/Output Summary (Last 24 hours) at 09/28/17 3852  Last data filed at 09/28

## 2017-09-29 VITALS
BODY MASS INDEX: 31.27 KG/M2 | SYSTOLIC BLOOD PRESSURE: 117 MMHG | DIASTOLIC BLOOD PRESSURE: 81 MMHG | RESPIRATION RATE: 16 BRPM | WEIGHT: 183.19 LBS | OXYGEN SATURATION: 92 % | HEIGHT: 64 IN | TEMPERATURE: 98 F | HEART RATE: 80 BPM

## 2017-09-29 PROCEDURE — 99239 HOSP IP/OBS DSCHRG MGMT >30: CPT | Performed by: INTERNAL MEDICINE

## 2017-09-29 PROCEDURE — 99232 SBSQ HOSP IP/OBS MODERATE 35: CPT | Performed by: INTERNAL MEDICINE

## 2017-09-29 RX ORDER — CEFUROXIME AXETIL 500 MG/1
500 TABLET ORAL EVERY 12 HOURS SCHEDULED
Qty: 6 TABLET | Refills: 0 | Status: SHIPPED | OUTPATIENT
Start: 2017-09-29 | End: 2017-10-10 | Stop reason: ALTCHOICE

## 2017-09-29 RX ORDER — PANTOPRAZOLE SODIUM 40 MG/1
40 TABLET, DELAYED RELEASE ORAL
Qty: 30 TABLET | Refills: 0 | Status: SHIPPED | OUTPATIENT
Start: 2017-09-30 | End: 2017-10-23

## 2017-09-29 RX ORDER — PSEUDOEPHEDRINE HCL 30 MG
100 TABLET ORAL 2 TIMES DAILY PRN
Qty: 60 CAPSULE | Refills: 0 | Status: SHIPPED | OUTPATIENT
Start: 2017-09-29 | End: 2017-11-30

## 2017-09-29 NOTE — RESTORATIVE THERAPY
RESTORATIVE CARE TREATMENT NOTE    Presenting Problem  Presenting Problem: COPD          Precautions  Precautions: None       Weight Bearing Restriction  Weight Bearing Restriction: None                   SUNDAY MONDAY TUESDAY WEDNESDAY THURSDAY FRIDAY SAT

## 2017-09-29 NOTE — PROGRESS NOTES
O2 sat room air @ rest 93%  O2 sat room air ambulating 86%  O2 sa 1L @ rest 95%  O2 sat 1L ambulating 93%-95%

## 2017-09-29 NOTE — PROGRESS NOTES
[facility]    Progress Note    Gerri Ponce May Patient Status:  Inpatient    1950 MRN [id number]   Location [facility] 3W/SW Attending Naomi Shepherd MD   Hosp Day # 4 PCP Suzy Brooks MD       SUBJECTIVE:  Feeling well.  No solution 3 mL 3 mL Nebulization Q6H PRN   predniSONE (DELTASONE) tab 60 mg 60 mg Oral Daily with breakfast   TraMADol HCl (ULTRAM) tab 50 mg 50 mg Oral Q6H PRN   Fluticasone Furoate-Vilanterol (BREO ELLIPTA) 100-25 MCG/INH inhaler 1 puff 1 puff Inhalation Improved     GERD  Felt better with  PPI     Constipation  Had BM with colace.      Anxiety/Grief reaction  Doing better today     HTN  BP controlled.     Home 02      Seizure disorder  On Dilantin with control           Dispo: Discharge today    Sandrita Jones

## 2017-09-29 NOTE — PROGRESS NOTES
Pulmonary/Critical Care Follow Up Note    HPI:   Rodrigo Macias is a 75year old female with Patient presents with:  Dyspnea GUANACO SOB (respiratory)      PCP Nasreen Miller MD  Admission Attending Nasreen Miller MD    Hospital Day #4    Feeling better  Fees li PRN  •  carvedilol (COREG) tab 25 mg, 25 mg, Oral, BID with meals  •  Clopidogrel Bisulfate (PLAVIX) tab 75 mg, 75 mg, Oral, Daily  •  hydrochlorothiazide (MICROZIDE) cap 12.5 mg, 12.5 mg, Oral, Daily  •  Phenytoin Sodium Extended (DILANTIN) ER cap 100 mg, respiratory failure  Resolved    3.   Prior nicotine dependence  Counseling and encouragement    Rec f/u COPD clinic      Gail Haro MD

## 2017-09-29 NOTE — DISCHARGE PLANNING
Patient A/O. Denies any pain or SOB. Patient to be discharged to home with home oxygen. Discharge instructions included medications, prescriptions, side effects, oxygen, and follow up appointments. Reviewed with patient.  Patient verbalized understanding an

## 2017-10-02 ENCOUNTER — PATIENT OUTREACH (OUTPATIENT)
Dept: INTERNAL MEDICINE CLINIC | Facility: CLINIC | Age: 67
End: 2017-10-02

## 2017-10-02 ENCOUNTER — TELEPHONE (OUTPATIENT)
Dept: MEDSURG UNIT | Facility: HOSPITAL | Age: 67
End: 2017-10-02

## 2017-10-02 NOTE — PROGRESS NOTES
Initial Post Discharge Follow Up   Discharge Date: 9/29/17  Contact Date: 10/2/2017    Consent Verification:  Assessment Completed With: Patient  HIPAA Verified? Yes    1. Tell me why you were in the hospital?  I had difficulty breathing. I have COPD. Device USE AS DIRECTED WITH PROAIR INHALER Disp: 1 Device Rfl: 0   PHENYTOIN SODIUM EXTENDED 100 MG Oral Cap TAKE 1 CAPSULE BY MOUTH THREE TIMES DAILY Disp: 270 capsule Rfl: 3   VENLAFAXINE HCL  MG Oral Capsule SR 24 Hr TAKE 1 CAPSULE BY MOUTH EVERY 15. Do you have a follow up visit scheduled with your Primary Care Physician or Specialist?    Your appointments     Date & Time Appointment Department [facility])    Oct 06, 2017  9:00 AM KEVIN [address] Lacey with [facility] [place] [id number] /exacerbation of your COPD symptoms? I don't think they talked about that. Who is the physician managing your COPD?  Dr. Gavin Serrano    If you are experiencing a gradual worsening of your COPD symptoms, what instructions were you provided in regards to wh

## 2017-10-03 NOTE — DISCHARGE SUMMARY
[facility]    PATIENT'S NAME: MAY, [address]   ATTENDING PHYSICIAN: Syl Cody MD   PATIENT ACCOUNT#:   [id number]    LOCATION:  [address] [place] [address] #:   [id number]       YOB: 1950  ADMISSION DATE:       09/25/2017 condition. Rocephin was switched to cefuroxime p.o. She had constipation requiring stool softeners, and GERD improved with pantoprazole.     DISCHARGE MEDICATIONS:    Cefuroxime 500 mg b.i.d. for 3 more days, Colace 100 mg twice day, pantoprazole 40 mg da

## 2017-10-03 NOTE — PAYOR COMM NOTE
REQUESTING 2 ADDITIONAL DAYS FOR A TOTAL OF 4 INPATIENT DAYS  DISCHARGE REVIEW    Payor: IVONNE LINDQUIST  Subscriber #:  [id number]  Authorization Number: [id number]    Admit date: 9/25/17  Admit time:  1150  Discharge Date: 9/29/2017  3:57 PM     Admitting Isaac Psychiatric/Behavioral: Positive for depressed mood. Negative for behavioral problems, confusion and sleep disturbance. The patient is nervous/anxious.          PATIENT MORE ANXIOUS THAN USUAL, STEROID EFFECT.          Objective:   Blood pressure 141/71, pu BP IS CONTROLLED TODAY . Mainor Hamilton  PATIENT WAS UP WALKING IN THE HALLS , ENCOURAGED TO GET MORE EXERCISE.            Results:      Lab Results  Component Value Date   WBC 8.1 09/27/2017   HGB 13.5 09/27/2017   HCT 39.1 09/27/2017    09/27/2017   CREATSERUM Location [facility] 3W/SW Attending Vernon Young MD   Hosp Day # 3 PCP Lilli eHnsley MD         SUBJECTIVE:  Breathing better. Still feeling nervous. C/o heartburn. Constipated.  No CP or N/V.     OBJECTIVE:  Vital signs in last 24 hours:  / Fluticasone Furoate-Vilanterol (BREO ELLIPTA) 100-25 MCG/INH inhaler 1 puff 1 puff Inhalation Daily   Montelukast Sodium (SINGULAIR) tab 10 mg 10 mg Oral Daily   Umeclidinium Bromide (INCRUSE ELLIPTA) 62.5 MCG/INH inhaler 1 puff 1 puff Inhalation Daily   N    GERD  Start PPI     Constipation  Bowel protocol.  High fiber diet.     Anxiety/Grief reaction  Still grieving from son's passing.     HTN  BP controlled.     Home 02 eval     Seizure disorder  On Dilantin           Questions/concerns were discussed with WBC 6.9 09/29/2017   HGB 13.0 09/29/2017   HCT 38.6 09/29/2017    09/29/2017   CREATSERUM 0.43 09/29/2017   BUN 14 09/29/2017    09/29/2017   K 4.5 09/29/2017    09/29/2017   CO2 30 09/29/2017   GLU 99 09/29/2017   CA 9.4 09/29/2017   MG acetaminophen (TYLENOL) tab 650 mg 650 mg Oral Q6H PRN   Albuterol Sulfate  (90 Base) MCG/ACT inhaler 2 puff 2 puff Inhalation Q4H PRN   ibuprofen (MOTRIN) tab 800 mg 800 mg Oral TID PRN            Assessment  Patient Active Problem List:     Jay Herrera 3.   Gastroesophageal reflux disease. 4.   Seizure disorder. 5.   Constipation. HISTORY OF PRESENT ILLNESS:  This is a 23-year-old female admitted for shortness of breath. The patient has history of COPD with more than 50 pack-years of smoking. DISCHARGE MEDICATIONS:  She was discharged with the following medications:  Cefuroxime 500 mg b.i.d. for 3 more days, Colace 100 mg twice day, pantoprazole 40 mg daily, Spiriva Respimat 1 puff daily, clopidogrel 75 mg daily, Dilantin 100 mg 3 times a day,

## 2017-10-05 ENCOUNTER — TELEPHONE (OUTPATIENT)
Dept: INTERNAL MEDICINE CLINIC | Facility: CLINIC | Age: 67
End: 2017-10-05

## 2017-10-10 ENCOUNTER — OFFICE VISIT (OUTPATIENT)
Dept: INTERNAL MEDICINE CLINIC | Facility: CLINIC | Age: 67
End: 2017-10-10

## 2017-10-10 VITALS
SYSTOLIC BLOOD PRESSURE: 120 MMHG | WEIGHT: 183 LBS | HEIGHT: 61.5 IN | BODY MASS INDEX: 34.11 KG/M2 | TEMPERATURE: 97 F | HEART RATE: 64 BPM | DIASTOLIC BLOOD PRESSURE: 70 MMHG

## 2017-10-10 DIAGNOSIS — J44.1 ACUTE EXACERBATION OF CHRONIC OBSTRUCTIVE PULMONARY DISEASE (COPD) (HCC): Primary | ICD-10-CM

## 2017-10-10 DIAGNOSIS — I10 ESSENTIAL HYPERTENSION, BENIGN: ICD-10-CM

## 2017-10-10 PROCEDURE — 99495 TRANSJ CARE MGMT MOD F2F 14D: CPT | Performed by: INTERNAL MEDICINE

## 2017-10-10 RX ORDER — PEAK FLOW METER
EACH MISCELLANEOUS
Refills: 0 | COMMUNITY
Start: 2017-09-15 | End: 2020-03-13

## 2017-10-10 NOTE — PROGRESS NOTES
HPI:    Evelyn Macias is a 75year old female here today for hospital follow up.    She was discharged from Inpatient hospital, [facility]  to Home   Admission Date: 9/25/17   Discharge Date: 9/29/17  Hospital Discharge Diagnosis: No Value exists fo Take 100 mg by mouth 2 (two) times daily as needed for constipation.    Pantoprazole Sodium 40 MG Oral Tab EC Take 1 tablet (40 mg total) by mouth every morning before breakfast.   Tiotropium Bromide Monohydrate (SPIRIVA RESPIMAT) 2.5 MCG/ACT Inhalation Aer hyperlipidemia; Other ill-defined conditions(799.89); Scoliosis; Tobacco abuse; and Unspecified essential hypertension. She  has no past surgical history on file.     She family history includes Cancer in her mother and another family member; Hip surgery back is not tender, FROM of the extremities  EXTREMITIES: no cyanosis, clubbing or edema  NEURO: Oriented times three, cranial nerves are intact, motor and sensory are grossly intact    ASSESSMENT/ PLAN:   Essential hypertension, benign  Controlled on  Cur

## 2017-10-19 RX ORDER — ALPRAZOLAM 0.25 MG/1
TABLET ORAL
Qty: 270 TABLET | Refills: 1 | Status: SHIPPED
Start: 2017-10-19 | End: 2018-05-04

## 2017-10-23 RX ORDER — PANTOPRAZOLE SODIUM 40 MG/1
TABLET, DELAYED RELEASE ORAL
Qty: 30 TABLET | Refills: 11 | Status: SHIPPED | OUTPATIENT
Start: 2017-10-23 | End: 2017-12-18

## 2017-10-31 ENCOUNTER — NURSE ONLY (OUTPATIENT)
Dept: INTERNAL MEDICINE CLINIC | Facility: CLINIC | Age: 67
End: 2017-10-31

## 2017-10-31 DIAGNOSIS — J45.901 CHRONIC OBSTRUCTIVE ASTHMA WITH EXACERBATION (HCC): ICD-10-CM

## 2017-10-31 DIAGNOSIS — R56.9 SEIZURE (HCC): ICD-10-CM

## 2017-10-31 DIAGNOSIS — E78.00 PURE HYPERCHOLESTEROLEMIA: ICD-10-CM

## 2017-10-31 DIAGNOSIS — J44.1 CHRONIC OBSTRUCTIVE ASTHMA WITH EXACERBATION (HCC): ICD-10-CM

## 2017-10-31 DIAGNOSIS — I10 ESSENTIAL HYPERTENSION, BENIGN: Primary | ICD-10-CM

## 2017-10-31 PROCEDURE — 36415 COLL VENOUS BLD VENIPUNCTURE: CPT | Performed by: INTERNAL MEDICINE

## 2017-11-14 ENCOUNTER — TELEPHONE (OUTPATIENT)
Dept: INTERNAL MEDICINE CLINIC | Facility: CLINIC | Age: 67
End: 2017-11-14

## 2017-11-14 DIAGNOSIS — Z12.39 BREAST SCREENING: Primary | ICD-10-CM

## 2017-11-30 RX ORDER — PSEUDOEPHEDRINE HCL 30 MG
100 TABLET ORAL 2 TIMES DAILY PRN
Qty: 60 CAPSULE | Refills: 11 | Status: SHIPPED | OUTPATIENT
Start: 2017-11-30 | End: 2018-12-13

## 2017-12-02 RX ORDER — LIDOCAINE 50 MG/G
PATCH TOPICAL
Qty: 30 PATCH | Refills: 5 | Status: SHIPPED | OUTPATIENT
Start: 2017-12-02 | End: 2020-03-13

## 2017-12-11 ENCOUNTER — HOSPITAL ENCOUNTER (OUTPATIENT)
Dept: MAMMOGRAPHY | Age: 67
Discharge: HOME | End: 2017-12-11
Attending: INTERNAL MEDICINE
Payer: COMMERCIAL

## 2017-12-11 DIAGNOSIS — Z12.39 BREAST SCREENING: ICD-10-CM

## 2017-12-11 PROCEDURE — 77067 SCR MAMMO BI INCL CAD: CPT | Performed by: INTERNAL MEDICINE

## 2017-12-18 RX ORDER — PANTOPRAZOLE SODIUM 40 MG/1
TABLET, DELAYED RELEASE ORAL
Qty: 90 TABLET | Refills: 3 | Status: SHIPPED | OUTPATIENT
Start: 2017-12-18 | End: 2018-12-26

## 2017-12-26 RX ORDER — AMOXICILLIN AND CLAVULANATE POTASSIUM 875; 125 MG/1; MG/1
TABLET, FILM COATED ORAL
Qty: 20 TABLET | Refills: 0 | Status: SHIPPED | OUTPATIENT
Start: 2017-12-26 | End: 2018-02-08 | Stop reason: ALTCHOICE

## 2017-12-26 RX ORDER — INHALER, ASSIST DEVICES
SPACER (EA) MISCELLANEOUS
Qty: 1 DEVICE | Refills: 0 | Status: SHIPPED | OUTPATIENT
Start: 2017-12-26 | End: 2018-07-07

## 2017-12-26 RX ORDER — PREDNISONE 10 MG/1
TABLET ORAL
Qty: 60 TABLET | Refills: 1 | Status: SHIPPED | OUTPATIENT
Start: 2017-12-26 | End: 2018-02-08 | Stop reason: ALTCHOICE

## 2018-01-05 RX ORDER — SIMVASTATIN 40 MG
TABLET ORAL
Qty: 90 TABLET | Refills: 0 | Status: SHIPPED | OUTPATIENT
Start: 2018-01-05 | End: 2018-04-03

## 2018-01-05 RX ORDER — HYDROCHLOROTHIAZIDE 12.5 MG/1
CAPSULE, GELATIN COATED ORAL
Qty: 90 CAPSULE | Refills: 0 | Status: SHIPPED | OUTPATIENT
Start: 2018-01-05 | End: 2018-04-03

## 2018-01-15 RX ORDER — CARVEDILOL 25 MG/1
TABLET ORAL
Qty: 480 TABLET | Refills: 0 | Status: SHIPPED | OUTPATIENT
Start: 2018-01-15 | End: 2018-09-12

## 2018-01-15 RX ORDER — MOMETASONE FUROATE AND FORMOTEROL FUMARATE DIHYDRATE 200; 5 UG/1; UG/1
AEROSOL RESPIRATORY (INHALATION)
Qty: 39 G | Refills: 0 | Status: SHIPPED | OUTPATIENT
Start: 2018-01-15 | End: 2018-03-06

## 2018-02-08 ENCOUNTER — OFFICE VISIT (OUTPATIENT)
Dept: INTERNAL MEDICINE CLINIC | Facility: CLINIC | Age: 68
End: 2018-02-08

## 2018-02-08 VITALS
BODY MASS INDEX: 34.11 KG/M2 | SYSTOLIC BLOOD PRESSURE: 120 MMHG | WEIGHT: 183 LBS | TEMPERATURE: 98 F | DIASTOLIC BLOOD PRESSURE: 80 MMHG | HEART RATE: 76 BPM | HEIGHT: 61.5 IN

## 2018-02-08 DIAGNOSIS — J43.1 PANLOBULAR EMPHYSEMA (HCC): ICD-10-CM

## 2018-02-08 DIAGNOSIS — I10 ESSENTIAL HYPERTENSION, BENIGN: Primary | ICD-10-CM

## 2018-02-08 DIAGNOSIS — E78.00 PURE HYPERCHOLESTEROLEMIA: ICD-10-CM

## 2018-02-08 PROCEDURE — G0463 HOSPITAL OUTPT CLINIC VISIT: HCPCS | Performed by: INTERNAL MEDICINE

## 2018-02-08 PROCEDURE — 99214 OFFICE O/P EST MOD 30 MIN: CPT | Performed by: INTERNAL MEDICINE

## 2018-02-08 NOTE — PROGRESS NOTES
HPI:    Patient ID: Syl Oliveira is a 75year old female. Hypertension   This is a chronic problem. The current episode started more than 1 year ago. The problem is unchanged. The problem is controlled.  Pertinent negatives include no anxiety, blurred headaches.             Current Outpatient Prescriptions:  DULERA 200-5 MCG/ACT Inhalation Aerosol INHALE 2 PUFFS BY MOUTH TWICE DAILY Disp: 39 g Rfl: 0   CARVEDILOL 25 MG Oral Tab TAKE 1 TABLET BY MOUTH TWICE DAILY WITH FOOD Disp: 480 tablet Rfl: 0   HYDROC COMPRESSOR) Does not apply Kit Use every 6 hours as needed Disp: 1 kit Rfl: 0   Respiratory Therapy Supplies (NEBULIZER/TUBING/MOUTHPIECE) Does not apply Kit Use every 6 hours as needed Disp: 1 kit Rfl: 3     Allergies:  Lisinopril                  Comment

## 2018-02-12 RX ORDER — VALSARTAN 320 MG/1
TABLET ORAL
Qty: 90 TABLET | Refills: 3 | Status: SHIPPED | OUTPATIENT
Start: 2018-02-12 | End: 2018-08-09

## 2018-03-08 RX ORDER — MOMETASONE FUROATE AND FORMOTEROL FUMARATE DIHYDRATE 200; 5 UG/1; UG/1
AEROSOL RESPIRATORY (INHALATION)
Qty: 39 G | Refills: 11 | Status: SHIPPED | OUTPATIENT
Start: 2018-03-08 | End: 2019-05-25

## 2018-04-03 RX ORDER — SIMVASTATIN 40 MG
TABLET ORAL
Qty: 90 TABLET | Refills: 3 | Status: SHIPPED | OUTPATIENT
Start: 2018-04-03 | End: 2018-05-14 | Stop reason: ALTCHOICE

## 2018-04-03 RX ORDER — HYDROCHLOROTHIAZIDE 12.5 MG/1
CAPSULE, GELATIN COATED ORAL
Qty: 90 CAPSULE | Refills: 3 | Status: SHIPPED | OUTPATIENT
Start: 2018-04-03 | End: 2019-03-23

## 2018-04-03 NOTE — TELEPHONE ENCOUNTER
To Dr Eric Redd, was sent to IM/FM dept by mistake.    Thanks        Pending Prescriptions Disp Refills    HYDROCHLOROTHIAZIDE 12.5 MG Oral Cap [Pharmacy Med Name: HYDROCHLOROTHIAZIDE 12.5MG CAPSULES] 90 capsule 0     Sig: TAKE 1 CAPSULE BY MOUTH EVERY DAY      SIMVASTATIN 40 MG Oral Tab [Pharmacy Med Name: SIMVASTATIN 40MG TABLETS] 90 tablet 0     Sig: TAKE 1 TABLET BY MOUTH EVERY EVENING

## 2018-05-07 RX ORDER — ALPRAZOLAM 0.25 MG/1
TABLET ORAL
Qty: 270 TABLET | Refills: 1 | Status: SHIPPED
Start: 2018-05-07 | End: 2018-12-04

## 2018-05-11 ENCOUNTER — OFFICE VISIT (OUTPATIENT)
Dept: INTERNAL MEDICINE CLINIC | Facility: CLINIC | Age: 68
End: 2018-05-11

## 2018-05-11 ENCOUNTER — LAB ENCOUNTER (OUTPATIENT)
Dept: LAB | Age: 68
End: 2018-05-11
Attending: INTERNAL MEDICINE
Payer: COMMERCIAL

## 2018-05-11 VITALS
HEIGHT: 61.5 IN | DIASTOLIC BLOOD PRESSURE: 84 MMHG | SYSTOLIC BLOOD PRESSURE: 120 MMHG | WEIGHT: 183 LBS | TEMPERATURE: 98 F | HEART RATE: 68 BPM | BODY MASS INDEX: 34.11 KG/M2

## 2018-05-11 DIAGNOSIS — J43.1 PANLOBULAR EMPHYSEMA (HCC): ICD-10-CM

## 2018-05-11 DIAGNOSIS — E78.00 PURE HYPERCHOLESTEROLEMIA: ICD-10-CM

## 2018-05-11 DIAGNOSIS — R56.9 SEIZURE (HCC): ICD-10-CM

## 2018-05-11 DIAGNOSIS — E55.9 VITAMIN D DEFICIENCY: ICD-10-CM

## 2018-05-11 DIAGNOSIS — I10 ESSENTIAL HYPERTENSION, BENIGN: Primary | ICD-10-CM

## 2018-05-11 DIAGNOSIS — I10 ESSENTIAL HYPERTENSION, BENIGN: ICD-10-CM

## 2018-05-11 DIAGNOSIS — R73.9 HYPERGLYCEMIA: ICD-10-CM

## 2018-05-11 DIAGNOSIS — Z12.11 COLON CANCER SCREENING: ICD-10-CM

## 2018-05-11 PROCEDURE — 82652 VIT D 1 25-DIHYDROXY: CPT

## 2018-05-11 PROCEDURE — G0463 HOSPITAL OUTPT CLINIC VISIT: HCPCS | Performed by: INTERNAL MEDICINE

## 2018-05-11 PROCEDURE — 83036 HEMOGLOBIN GLYCOSYLATED A1C: CPT | Performed by: INTERNAL MEDICINE

## 2018-05-11 PROCEDURE — 36415 COLL VENOUS BLD VENIPUNCTURE: CPT | Performed by: INTERNAL MEDICINE

## 2018-05-11 PROCEDURE — 85025 COMPLETE CBC W/AUTO DIFF WBC: CPT

## 2018-05-11 PROCEDURE — 80053 COMPREHEN METABOLIC PANEL: CPT

## 2018-05-11 PROCEDURE — 80061 LIPID PANEL: CPT

## 2018-05-11 PROCEDURE — 99214 OFFICE O/P EST MOD 30 MIN: CPT | Performed by: INTERNAL MEDICINE

## 2018-05-11 PROCEDURE — 80185 ASSAY OF PHENYTOIN TOTAL: CPT

## 2018-05-11 NOTE — PROGRESS NOTES
HPI:    Patient ID: Isaac Manuel is a 75year old female. Hypertension   This is a chronic problem. The current episode started more than 1 year ago. The problem is unchanged. The problem is controlled.  Pertinent negatives include no anxiety, blurred genital sores, hematuria and urgency. Musculoskeletal: Negative for neck pain and neck stiffness. Allergic/Immunologic: Negative for immunocompromised state.    Neurological: Negative for dizziness, syncope, facial asymmetry, weakness, light-headedness, 90 capsule Rfl: 3   VALVED HOLDING CHAMBER Does not apply Device USE AS DIRECTED WITH PROAIR INHALER Disp: 1 Device Rfl: 0   Nebulizers (VIOS AEROSOL DELIVERY SYSTEM) Does not apply Misc U UTD Q 6 HOURS PRN Disp:  Rfl: 0   Respiratory Therapy Supplies (NEB Seizure ([facility] [address]  Dilantin level.      Vitamin D deficiency  vut D level          Orders Placed This Encounter      CBC W Differential W Platelet [E]      Comp Metabolic Panel (14) [E]      Hemoglobin A1C [E]      Lipid Panel [E]      Vitamin D, 1,25 Dihyd

## 2018-05-14 RX ORDER — ATORVASTATIN CALCIUM 40 MG/1
40 TABLET, FILM COATED ORAL NIGHTLY
Qty: 90 TABLET | Refills: 3 | Status: SHIPPED | OUTPATIENT
Start: 2018-05-14 | End: 2019-04-13

## 2018-05-22 ENCOUNTER — TELEPHONE (OUTPATIENT)
Dept: GASTROENTEROLOGY | Facility: CLINIC | Age: 68
End: 2018-05-22

## 2018-05-23 NOTE — TELEPHONE ENCOUNTER
Forwarded to [facility] PEBBLES--pt contacted and accepted appt with you on Wed 6/6 arrival 12:45pm and given directions to [facility] RAQUEL. The 9/24/09 report on your desk. Thanks.     Last Procedure:  Colonoscopy 9/24/09 for screening  Last Diagnosis:  Found adenomatous diann

## 2018-05-29 RX ORDER — VENLAFAXINE HYDROCHLORIDE 150 MG/1
CAPSULE, EXTENDED RELEASE ORAL
Qty: 90 CAPSULE | Refills: 3 | Status: SHIPPED | OUTPATIENT
Start: 2018-05-29 | End: 2019-04-26

## 2018-05-29 RX ORDER — PHENYTOIN SODIUM 100 MG/1
CAPSULE, EXTENDED RELEASE ORAL
Qty: 270 CAPSULE | Refills: 3 | Status: SHIPPED | OUTPATIENT
Start: 2018-05-29 | End: 2019-04-26

## 2018-05-31 NOTE — H&P
[address] [facility] [address] Gastroenterology                                                                                                  Clinic History and Physical     Pa endoscopies:  September 2009 colonoscopy performed by Dr. Kimberly Dela Cruz with [facility] r/t colon cancer screening, findings: Colon polyp ×2 (adenomatous), diverticulosis, internal hemorrhoids, 5 year recall    Social Hx:  + Former smoker 1 PPD ×50 years, quit 21 TAKE 1 CAPSULE BY MOUTH THREE TIMES DAILY Disp: 270 capsule Rfl: 3   atorvastatin 40 MG Oral Tab Take 1 tablet (40 mg total) by mouth nightly.  Disp: 90 tablet Rfl: 3   ALPRAZolam 0.25 MG Oral Tab TAKE 1 TABLET BY MOUTH EVERY MORNING AND 2 TABLETS BY MOUTH 3       Allergies:    Shellfish Allergy       ANAPHYLAXIS    Comment:Other reaction(s):  Anaphylaxis  Lisinopril              Coughing    Comment:Other reaction(s): Cough    ROS:   CONSTITUTIONAL:  negative for fevers, rigors  EYES:  negative for diplopia scoliosis, hypertension, depression, COPD, seizure disorder, who presents for colon cancer screening evaluation. 1.  History of adenomatous colon polyps: Physical exam unremarkable.   Patient is overdue for a colonoscopy recall due to her past history intervention [i.e. polypectomy, stent placement, etc.] as indicated. Orders This Visit:  No orders of the defined types were placed in this encounter.       Meds This Visit:    Signed Prescriptions Disp Refills    PEG 3350-KCl-NaBcb-NaCl-NaSulf (COLYTE

## 2018-06-06 ENCOUNTER — OFFICE VISIT (OUTPATIENT)
Dept: GASTROENTEROLOGY | Facility: CLINIC | Age: 68
End: 2018-06-06

## 2018-06-06 ENCOUNTER — TELEPHONE (OUTPATIENT)
Dept: GASTROENTEROLOGY | Facility: CLINIC | Age: 68
End: 2018-06-06

## 2018-06-06 VITALS
BODY MASS INDEX: 35.79 KG/M2 | SYSTOLIC BLOOD PRESSURE: 134 MMHG | HEART RATE: 60 BPM | WEIGHT: 192 LBS | HEIGHT: 61.5 IN | DIASTOLIC BLOOD PRESSURE: 81 MMHG

## 2018-06-06 DIAGNOSIS — K21.9 GASTROESOPHAGEAL REFLUX DISEASE, ESOPHAGITIS PRESENCE NOT SPECIFIED: ICD-10-CM

## 2018-06-06 DIAGNOSIS — Z80.0 FAMILY HISTORY OF COLON CANCER: ICD-10-CM

## 2018-06-06 DIAGNOSIS — Z86.010 HISTORY OF COLON POLYPS: Primary | ICD-10-CM

## 2018-06-06 DIAGNOSIS — Z86.010 HX OF ADENOMATOUS COLONIC POLYPS: Primary | ICD-10-CM

## 2018-06-06 DIAGNOSIS — Z80.0 FAMILY HISTORY OF ESOPHAGEAL CANCER: ICD-10-CM

## 2018-06-06 PROCEDURE — 99203 OFFICE O/P NEW LOW 30 MIN: CPT | Performed by: NURSE PRACTITIONER

## 2018-06-06 PROCEDURE — G0463 HOSPITAL OUTPT CLINIC VISIT: HCPCS | Performed by: NURSE PRACTITIONER

## 2018-06-06 NOTE — TELEPHONE ENCOUNTER
Scheduled for:  Colonoscopy 196[address]  Provider Name:   Date:  8/16/18  Location:  [facility]  Sedation:  Mac  Time:  0945 / Arrival 0845   Prep: Split dose Colyte  Meds/Allergies Reconciled?:  Physician reviewed by:PEBBLES Uribe  Diagnosis with codes:

## 2018-06-06 NOTE — PATIENT INSTRUCTIONS
1. Schedule colonoscopy with Dr. Kimberly Dela Cruz w/ [facility]    2.  bowel prep from pharmacy - split dose Colyte    3.  Continue all medications for procedure except Plavix (Please contact prescribing MD (Dr. Larry Jacinto) for specific recommendations including number

## 2018-06-28 RX ORDER — CLOPIDOGREL BISULFATE 75 MG/1
TABLET ORAL
Qty: 90 TABLET | Refills: 3 | Status: SHIPPED | OUTPATIENT
Start: 2018-06-28 | End: 2019-04-13

## 2018-07-09 RX ORDER — INHALER, ASSIST DEVICES
SPACER (EA) MISCELLANEOUS
Qty: 1 DEVICE | Refills: 0 | Status: SHIPPED | OUTPATIENT
Start: 2018-07-09 | End: 2020-03-13

## 2018-08-08 ENCOUNTER — TELEPHONE (OUTPATIENT)
Dept: INTERNAL MEDICINE CLINIC | Facility: CLINIC | Age: 68
End: 2018-08-08

## 2018-08-08 NOTE — TELEPHONE ENCOUNTER
Primitivo Veras Osmani needs a new medication to replace her Valsartan 320mg tablets which was recalled.  Pls fax [facility] a new prescription approval.

## 2018-08-09 ENCOUNTER — TELEPHONE (OUTPATIENT)
Dept: FAMILY MEDICINE CLINIC | Facility: CLINIC | Age: 68
End: 2018-08-09

## 2018-08-09 RX ORDER — IRBESARTAN 300 MG/1
300 TABLET ORAL NIGHTLY
Qty: 90 TABLET | Refills: 3 | Status: SHIPPED | OUTPATIENT
Start: 2018-08-09 | End: 2019-04-13

## 2018-08-10 ENCOUNTER — TELEPHONE (OUTPATIENT)
Dept: FAMILY MEDICINE CLINIC | Facility: CLINIC | Age: 68
End: 2018-08-10

## 2018-08-13 RX ORDER — MONTELUKAST SODIUM 10 MG/1
TABLET ORAL
Qty: 90 TABLET | Refills: 3 | Status: SHIPPED | OUTPATIENT
Start: 2018-08-13 | End: 2019-07-12

## 2018-08-14 ENCOUNTER — TELEPHONE (OUTPATIENT)
Dept: GASTROENTEROLOGY | Facility: CLINIC | Age: 68
End: 2018-08-14

## 2018-08-14 NOTE — TELEPHONE ENCOUNTER
I spoke to Tya Rico at endoscopy [facility] re below. She recommends that pt actually come to the blue lot, main entrance, as the  will be able to get her a wheelchair and contact volunteer services to take her to endoscopy.  Pt should bring a full tank of

## 2018-08-14 NOTE — TELEPHONE ENCOUNTER
Pt has cln hola 8/16 and requesting wheelchair and O2 access/availibality on this date, pls call at:[phone],thanks.   *Pt has copd

## 2018-08-16 ENCOUNTER — ANESTHESIA EVENT (OUTPATIENT)
Dept: ENDOSCOPY | Facility: HOSPITAL | Age: 68
End: 2018-08-16
Payer: MEDICARE

## 2018-08-16 ENCOUNTER — SURGERY (OUTPATIENT)
Age: 68
End: 2018-08-16

## 2018-08-16 ENCOUNTER — HOSPITAL ENCOUNTER (OUTPATIENT)
Facility: HOSPITAL | Age: 68
Setting detail: HOSPITAL OUTPATIENT SURGERY
Discharge: HOME | End: 2018-08-16
Attending: INTERNAL MEDICINE | Admitting: INTERNAL MEDICINE
Payer: MEDICARE

## 2018-08-16 ENCOUNTER — ANESTHESIA (OUTPATIENT)
Dept: ENDOSCOPY | Facility: HOSPITAL | Age: 68
End: 2018-08-16
Payer: MEDICARE

## 2018-08-16 VITALS
DIASTOLIC BLOOD PRESSURE: 95 MMHG | HEIGHT: 63 IN | WEIGHT: 190 LBS | RESPIRATION RATE: 15 BRPM | SYSTOLIC BLOOD PRESSURE: 180 MMHG | BODY MASS INDEX: 33.66 KG/M2 | HEART RATE: 68 BPM | OXYGEN SATURATION: 98 % | TEMPERATURE: 98 F

## 2018-08-16 DIAGNOSIS — Z86.010 HISTORY OF COLON POLYPS: ICD-10-CM

## 2018-08-16 DIAGNOSIS — Z80.0 FAMILY HISTORY OF COLON CANCER: ICD-10-CM

## 2018-08-16 PROCEDURE — 0DBP8ZX EXCISION OF RECTUM, VIA NATURAL OR ARTIFICIAL OPENING ENDOSCOPIC, DIAGNOSTIC: ICD-10-PCS | Performed by: INTERNAL MEDICINE

## 2018-08-16 PROCEDURE — 45385 COLONOSCOPY W/LESION REMOVAL: CPT | Performed by: INTERNAL MEDICINE

## 2018-08-16 PROCEDURE — 45380 COLONOSCOPY AND BIOPSY: CPT | Performed by: INTERNAL MEDICINE

## 2018-08-16 PROCEDURE — 0DBH8ZX EXCISION OF CECUM, VIA NATURAL OR ARTIFICIAL OPENING ENDOSCOPIC, DIAGNOSTIC: ICD-10-PCS | Performed by: INTERNAL MEDICINE

## 2018-08-16 RX ORDER — MORPHINE SULFATE 4 MG/ML
2 INJECTION, SOLUTION INTRAMUSCULAR; INTRAVENOUS EVERY 10 MIN PRN
Status: DISCONTINUED | OUTPATIENT
Start: 2018-08-16 | End: 2018-08-16

## 2018-08-16 RX ORDER — SODIUM CHLORIDE, SODIUM LACTATE, POTASSIUM CHLORIDE, CALCIUM CHLORIDE 600; 310; 30; 20 MG/100ML; MG/100ML; MG/100ML; MG/100ML
INJECTION, SOLUTION INTRAVENOUS CONTINUOUS
Status: DISCONTINUED | OUTPATIENT
Start: 2018-08-16 | End: 2018-08-16

## 2018-08-16 RX ORDER — MIDAZOLAM HYDROCHLORIDE 1 MG/ML
INJECTION INTRAMUSCULAR; INTRAVENOUS AS NEEDED
Status: DISCONTINUED | OUTPATIENT
Start: 2018-08-16 | End: 2018-08-16 | Stop reason: SURG

## 2018-08-16 RX ORDER — NALOXONE HYDROCHLORIDE 0.4 MG/ML
80 INJECTION, SOLUTION INTRAMUSCULAR; INTRAVENOUS; SUBCUTANEOUS AS NEEDED
Status: DISCONTINUED | OUTPATIENT
Start: 2018-08-16 | End: 2018-08-16

## 2018-08-16 RX ORDER — MORPHINE SULFATE 10 MG/ML
6 INJECTION, SOLUTION INTRAMUSCULAR; INTRAVENOUS EVERY 10 MIN PRN
Status: DISCONTINUED | OUTPATIENT
Start: 2018-08-16 | End: 2018-08-16

## 2018-08-16 RX ORDER — ONDANSETRON 2 MG/ML
4 INJECTION INTRAMUSCULAR; INTRAVENOUS ONCE AS NEEDED
Status: DISCONTINUED | OUTPATIENT
Start: 2018-08-16 | End: 2018-08-16

## 2018-08-16 RX ORDER — LIDOCAINE HYDROCHLORIDE 10 MG/ML
INJECTION, SOLUTION EPIDURAL; INFILTRATION; INTRACAUDAL; PERINEURAL AS NEEDED
Status: DISCONTINUED | OUTPATIENT
Start: 2018-08-16 | End: 2018-08-16 | Stop reason: SURG

## 2018-08-16 RX ORDER — HYDROCODONE BITARTRATE AND ACETAMINOPHEN 5; 325 MG/1; MG/1
1 TABLET ORAL AS NEEDED
Status: DISCONTINUED | OUTPATIENT
Start: 2018-08-16 | End: 2018-08-16

## 2018-08-16 RX ORDER — MORPHINE SULFATE 4 MG/ML
4 INJECTION, SOLUTION INTRAMUSCULAR; INTRAVENOUS EVERY 10 MIN PRN
Status: DISCONTINUED | OUTPATIENT
Start: 2018-08-16 | End: 2018-08-16

## 2018-08-16 RX ORDER — HYDROCODONE BITARTRATE AND ACETAMINOPHEN 5; 325 MG/1; MG/1
2 TABLET ORAL AS NEEDED
Status: DISCONTINUED | OUTPATIENT
Start: 2018-08-16 | End: 2018-08-16

## 2018-08-16 RX ORDER — HALOPERIDOL 5 MG/ML
0.25 INJECTION INTRAMUSCULAR ONCE AS NEEDED
Status: DISCONTINUED | OUTPATIENT
Start: 2018-08-16 | End: 2018-08-16

## 2018-08-16 RX ADMIN — SODIUM CHLORIDE, SODIUM LACTATE, POTASSIUM CHLORIDE, CALCIUM CHLORIDE: 600; 310; 30; 20 INJECTION, SOLUTION INTRAVENOUS at 09:45:00

## 2018-08-16 RX ADMIN — LIDOCAINE HYDROCHLORIDE 50 MG: 10 INJECTION, SOLUTION EPIDURAL; INFILTRATION; INTRACAUDAL; PERINEURAL at 10:02:00

## 2018-08-16 RX ADMIN — MIDAZOLAM HYDROCHLORIDE 2 MG: 1 INJECTION INTRAMUSCULAR; INTRAVENOUS at 10:00:00

## 2018-08-16 NOTE — ANESTHESIA POSTPROCEDURE EVALUATION
Patient: Ingrid Nieto May    Procedure Summary     Date:  08/16/18 Room / Location:  [facility] ENDOSCOPY 05 / [facility] ENDOSCOPY    Anesthesia Start:  1000 Anesthesia Stop:      Procedure:  COLONOSCOPY (N/A ) Diagnosis:       Family history of colon cancer      History

## 2018-08-16 NOTE — ANESTHESIA PREPROCEDURE EVALUATION
Anesthesia PreOp Note    HPI:     Homer Macias is a 70year old female who presents for preoperative consultation requested by: Yana Pierre MD    Date of Surgery: 8/16/2018    Procedure(s):  COLONOSCOPY  Indication: Family history of colon cancer, essential hypertension     SZ \"due to high blood pressure\", 2012       Past Surgical History:  2009: COLONOSCOPY  2008: EXPLORATION, CAROTID ARTERY      Prescriptions Prior to Admission:  MONTELUKAST SODIUM 10 MG Oral Tab TAKE 1 TABLET(10 MG) BY MOUTH DA (two) times daily as needed. Disp: 60 capsule Rfl: 11 Taking   Tiotropium Bromide Monohydrate (SPIRIVA RESPIMAT) 2.5 MCG/ACT Inhalation Aero Soln Inhale 1 puff into the lungs 2 (two) times daily.  Disp: 3 Inhaler Rfl: 3 Taking   Nebulizers (VIOS AEROSOL DEL 1.6 m (5' 3\")        Anesthesia ROS/Med Hx and Physical Exam     Patient summary reviewed and Nursing notes reviewed    Airway   Mallampati: II  TM distance: >3 FB  Neck ROM: full  Dental      Pulmonary - normal exam   (+) COPD mild, asthma, shortness of

## 2018-08-16 NOTE — OPERATIVE REPORT
[facility] HOSP - [facility] Endoscopy Report      Preoperative Diagnosis:  - history of colon polyps, last exam in 2009      Postoperative Diagnosis:  - colon polyps x 5   - diverticulosis  - internal hemorrhoids      Procedure:    Colonoscopy       Surgeon

## 2018-08-16 NOTE — H&P
History & Physical Examination    Patient Name: Todd Dumont  MRN: [id number]  CSN: [id number]  YOB: 1950    Diagnosis: history of colon polyps     Prescriptions Prior to Admission:  MONTELUKAST SODIUM 10 MG Oral Tab TAKE 1 TABLET(10 MG) BY mouth 2 (two) times daily as needed. Disp: 60 capsule Rfl: 11 Taking   Tiotropium Bromide Monohydrate (SPIRIVA RESPIMAT) 2.5 MCG/ACT Inhalation Aero Soln Inhale 1 puff into the lungs 2 (two) times daily.  Disp: 3 Inhaler Rfl: 3 Taking   Nebulizers (VIOS AER • Arthritis    • Colon polyp 2009   • COPD (chronic obstructive pulmonary disease) (HCC)    • CVA (cerebral infarction)     CVA by CT only   • Depression    • Esophageal reflux    • Hemorrhoids    • Hepatitis B 1993   • High blood pressure    • High chol

## 2018-08-21 ENCOUNTER — TELEPHONE (OUTPATIENT)
Dept: GASTROENTEROLOGY | Facility: CLINIC | Age: 68
End: 2018-08-21

## 2018-08-21 NOTE — TELEPHONE ENCOUNTER
Entered into EPIC:Recall colon in 3 years per Dr. Tracey Vora. Last Colon done 8/16/18, next due 8/16/21. Snapshot updated. Letter mailed.

## 2018-08-21 NOTE — TELEPHONE ENCOUNTER
----- Message from Wes Grady MD sent at 8/20/2018  6:33 PM CDT -----  I wanted to get back to you with your colonoscopy results. You had 5 colon polyps removed which were benign.   I would advise a repeat colonoscopy in 3 years to make sure no new p

## 2018-09-13 RX ORDER — CARVEDILOL 25 MG/1
TABLET ORAL
Qty: 180 TABLET | Refills: 1 | Status: SHIPPED | OUTPATIENT
Start: 2018-09-13 | End: 2019-03-05

## 2018-09-13 NOTE — TELEPHONE ENCOUNTER
No protocol associated in Rx folder, sent to provider    Hypertensive Medications  Protocol Criteria:  · Appointment scheduled in the past 6 months or in the next 3 months  · BMP or CMP in the past 12 months  · Creatinine result < 2  Recent Outpatient Visi

## 2018-09-24 RX ORDER — TIOTROPIUM BROMIDE INHALATION SPRAY 3.12 UG/1
SPRAY, METERED RESPIRATORY (INHALATION)
Qty: 12 G | Refills: 3 | Status: SHIPPED | OUTPATIENT
Start: 2018-09-24 | End: 2019-09-14

## 2018-10-12 ENCOUNTER — LAB ENCOUNTER (OUTPATIENT)
Dept: LAB | Age: 68
End: 2018-10-12
Attending: INTERNAL MEDICINE
Payer: MEDICARE

## 2018-10-12 ENCOUNTER — OFFICE VISIT (OUTPATIENT)
Dept: INTERNAL MEDICINE CLINIC | Facility: CLINIC | Age: 68
End: 2018-10-12
Payer: MEDICARE

## 2018-10-12 VITALS
SYSTOLIC BLOOD PRESSURE: 134 MMHG | TEMPERATURE: 98 F | DIASTOLIC BLOOD PRESSURE: 84 MMHG | HEIGHT: 61.5 IN | BODY MASS INDEX: 34.48 KG/M2 | WEIGHT: 185 LBS | HEART RATE: 68 BPM

## 2018-10-12 DIAGNOSIS — R56.9 SEIZURE (HCC): ICD-10-CM

## 2018-10-12 DIAGNOSIS — I10 ESSENTIAL HYPERTENSION, BENIGN: Primary | ICD-10-CM

## 2018-10-12 DIAGNOSIS — E78.00 PURE HYPERCHOLESTEROLEMIA: ICD-10-CM

## 2018-10-12 DIAGNOSIS — I10 ESSENTIAL HYPERTENSION, BENIGN: ICD-10-CM

## 2018-10-12 DIAGNOSIS — R73.9 HYPERGLYCEMIA: ICD-10-CM

## 2018-10-12 DIAGNOSIS — J43.1 PANLOBULAR EMPHYSEMA (HCC): ICD-10-CM

## 2018-10-12 PROCEDURE — 85025 COMPLETE CBC W/AUTO DIFF WBC: CPT

## 2018-10-12 PROCEDURE — 80053 COMPREHEN METABOLIC PANEL: CPT

## 2018-10-12 PROCEDURE — G0463 HOSPITAL OUTPT CLINIC VISIT: HCPCS | Performed by: INTERNAL MEDICINE

## 2018-10-12 PROCEDURE — G0008 ADMIN INFLUENZA VIRUS VAC: HCPCS | Performed by: INTERNAL MEDICINE

## 2018-10-12 PROCEDURE — 36415 COLL VENOUS BLD VENIPUNCTURE: CPT | Performed by: INTERNAL MEDICINE

## 2018-10-12 PROCEDURE — 90653 IIV ADJUVANT VACCINE IM: CPT | Performed by: INTERNAL MEDICINE

## 2018-10-12 PROCEDURE — 80185 ASSAY OF PHENYTOIN TOTAL: CPT

## 2018-10-12 PROCEDURE — 80061 LIPID PANEL: CPT

## 2018-10-12 PROCEDURE — 99214 OFFICE O/P EST MOD 30 MIN: CPT | Performed by: INTERNAL MEDICINE

## 2018-10-12 PROCEDURE — 83036 HEMOGLOBIN GLYCOSYLATED A1C: CPT | Performed by: INTERNAL MEDICINE

## 2018-10-12 NOTE — PROGRESS NOTES
HPI:    Patient ID: Kristian Carvajal is a 70year old female. Hypertension   This is a chronic problem. The current episode started more than 1 year ago. The problem is unchanged. The problem is controlled.  Pertinent negatives include no anxiety, blurred RESPIMAT 2.5 MCG/ACT Inhalation Aero Soln INHALE 1 PUFF INTO THE LUNGS TWICE DAILY Disp: 12 g Rfl: 3   CARVEDILOL 25 MG Oral Tab TAKE 1 TABLET BY MOUTH TWICE DAILY WITH FOOD Disp: 180 tablet Rfl: 1   MONTELUKAST SODIUM 10 MG Oral Tab TAKE 1 TABLET(10 MG) B Supplies (NEBULIZER/TUBING/MOUTHPIECE) Does not apply Kit Use every 6 hours as needed Disp: 1 kit Rfl: 3     Allergies:  Shellfish Allergy       ANAPHYLAXIS    Comment:Other reaction(s):  Anaphylaxis  Lisinopril              Coughing    Comment:Other reacti [[id number]]      Meds This Visit:  Requested Prescriptions      No prescriptions requested or ordered in this encounter       Imaging & Referrals:  FLU VACCINE ADJUVANT IM  PULMONARY - INTERNAL       AC#[id number]

## 2018-11-14 ENCOUNTER — OFFICE VISIT (OUTPATIENT)
Dept: PULMONOLOGY | Facility: CLINIC | Age: 68
End: 2018-11-14
Payer: MEDICARE

## 2018-11-14 VITALS
BODY MASS INDEX: 34.15 KG/M2 | WEIGHT: 200 LBS | RESPIRATION RATE: 18 BRPM | HEART RATE: 64 BPM | SYSTOLIC BLOOD PRESSURE: 174 MMHG | OXYGEN SATURATION: 100 % | HEIGHT: 64 IN | DIASTOLIC BLOOD PRESSURE: 93 MMHG

## 2018-11-14 DIAGNOSIS — J44.9 CHRONIC OBSTRUCTIVE PULMONARY DISEASE, UNSPECIFIED COPD TYPE (HCC): Primary | ICD-10-CM

## 2018-11-14 DIAGNOSIS — Z87.891 FORMER SMOKER: ICD-10-CM

## 2018-11-14 PROCEDURE — 99204 OFFICE O/P NEW MOD 45 MIN: CPT | Performed by: INTERNAL MEDICINE

## 2018-11-14 PROCEDURE — G0463 HOSPITAL OUTPT CLINIC VISIT: HCPCS | Performed by: INTERNAL MEDICINE

## 2018-11-14 NOTE — PROGRESS NOTES
[facility] MEDICAL OFFICE BUILDING, [facility]    Report of Consultation    Imelda Sides May Patient Status:  Outpatient    1950 MRN [id number]   Location [facility] Family History  Family History   Problem Relation Age of Onset   • Stroke Father         CVA   • Other (Hip surgery) Father    • Cancer Mother         Cancer-throat   • Cancer Other         Cancer-colon, Grandfather       Social History  Social Histo PFTs in 2014  )   Extensive history of smoking quit in 2013 (50-pack-year history of smoking )     Now stable / no evidence of exacerbation     2-chronic respiratory failure with hypoxemia on chronic home O2 therapy 2 L nasal cannula    3- screening for tadeo

## 2018-11-20 ENCOUNTER — TELEPHONE (OUTPATIENT)
Dept: PULMONOLOGY | Facility: CLINIC | Age: 68
End: 2018-11-20

## 2018-11-20 NOTE — TELEPHONE ENCOUNTER
Cover sheet stating no ambulatory ox done & office vis enctr from 11/14/18 faxed to Betty Burns @ [facility] at #[phone]. Conf rcvd.

## 2018-11-20 NOTE — TELEPHONE ENCOUNTER
Irais/PERNELL called to request face to face notes and oxygen saturations from pts 11/14/18 office visit. Please fax to [phone].

## 2018-12-04 NOTE — TELEPHONE ENCOUNTER
Therese needs a refill of:      ALPRAZolam 0.25 MG Oral Tab TAKE 1 TABLET BY MOUTH EVERY MORNING AND 2 TABLETS BY MOUTH EVERY EVENING Disp: 270 tablet Rfl: 1

## 2018-12-05 NOTE — TELEPHONE ENCOUNTER
No Protocol on this med.      Requested Prescriptions     Pending Prescriptions Disp Refills   • ALPRAZolam 0.25 MG Oral Tab 270 tablet 1     Sig: TAKE 1 TABLET BY MOUTH EVERY MORNING AND 2 TABLETS BY MOUTH EVERY EVENING       Last Office Visit with PCP: 10

## 2018-12-06 RX ORDER — ALPRAZOLAM 0.25 MG/1
TABLET ORAL
Qty: 270 TABLET | Refills: 1 | Status: SHIPPED
Start: 2018-12-06 | End: 2019-06-15

## 2018-12-06 RX ORDER — ALPRAZOLAM 0.25 MG/1
TABLET ORAL
Qty: 270 TABLET | Refills: 1 | OUTPATIENT
Start: 2018-12-06 | End: 2018-12-06

## 2018-12-13 RX ORDER — DOCUSATE SODIUM 100 MG/1
CAPSULE, LIQUID FILLED ORAL
Qty: 60 CAPSULE | Refills: 11 | Status: SHIPPED | OUTPATIENT
Start: 2018-12-13 | End: 2020-01-16

## 2018-12-27 RX ORDER — PANTOPRAZOLE SODIUM 40 MG/1
TABLET, DELAYED RELEASE ORAL
Qty: 90 TABLET | Refills: 0 | Status: SHIPPED | OUTPATIENT
Start: 2018-12-27 | End: 2019-03-20

## 2018-12-27 NOTE — TELEPHONE ENCOUNTER
Requested Prescriptions     Pending Prescriptions Disp Refills   • Pantoprazole Sodium 40 MG Oral Tab EC [Pharmacy Med Name: PANTOPRAZOLE 40MG TABLETS] 90 tablet 0     Sig: TAKE 1 TABLET BY MOUTH [address]       Last Office Visit w

## 2019-01-07 ENCOUNTER — TELEPHONE (OUTPATIENT)
Dept: FAMILY MEDICINE CLINIC | Facility: CLINIC | Age: 69
End: 2019-01-07

## 2019-01-07 DIAGNOSIS — Z12.39 BREAST SCREENING: Primary | ICD-10-CM

## 2019-01-15 ENCOUNTER — TELEPHONE (OUTPATIENT)
Dept: PULMONOLOGY | Facility: CLINIC | Age: 69
End: 2019-01-15

## 2019-01-15 ENCOUNTER — HOSPITAL ENCOUNTER (OUTPATIENT)
Dept: MAMMOGRAPHY | Age: 69
Discharge: HOME | End: 2019-01-15
Attending: INTERNAL MEDICINE
Payer: MEDICARE

## 2019-01-15 DIAGNOSIS — Z12.39 BREAST SCREENING: ICD-10-CM

## 2019-01-15 PROCEDURE — 77067 SCR MAMMO BI INCL CAD: CPT | Performed by: INTERNAL MEDICINE

## 2019-01-15 NOTE — TELEPHONE ENCOUNTER
Evelyn Coronel from [address] [place] [address] is calling to confirm and copies of the pts last oxygen test(oximitry) and office visit notes to bill medicare- fax number [phone]

## 2019-01-17 DIAGNOSIS — N60.11 BILATERAL FIBROCYSTIC BREAST CHANGES: Primary | ICD-10-CM

## 2019-01-17 DIAGNOSIS — N60.12 BILATERAL FIBROCYSTIC BREAST CHANGES: Primary | ICD-10-CM

## 2019-01-18 ENCOUNTER — TELEPHONE (OUTPATIENT)
Dept: PULMONOLOGY | Facility: CLINIC | Age: 69
End: 2019-01-18

## 2019-01-18 ENCOUNTER — OFFICE VISIT (OUTPATIENT)
Dept: INTERNAL MEDICINE CLINIC | Facility: CLINIC | Age: 69
End: 2019-01-18
Payer: MEDICARE

## 2019-01-18 VITALS
HEART RATE: 64 BPM | WEIGHT: 187 LBS | TEMPERATURE: 97 F | HEIGHT: 64 IN | DIASTOLIC BLOOD PRESSURE: 76 MMHG | BODY MASS INDEX: 31.92 KG/M2 | SYSTOLIC BLOOD PRESSURE: 120 MMHG

## 2019-01-18 DIAGNOSIS — I10 ESSENTIAL HYPERTENSION, BENIGN: Primary | ICD-10-CM

## 2019-01-18 DIAGNOSIS — J43.1 PANLOBULAR EMPHYSEMA (HCC): ICD-10-CM

## 2019-01-18 PROCEDURE — G0009 ADMIN PNEUMOCOCCAL VACCINE: HCPCS | Performed by: INTERNAL MEDICINE

## 2019-01-18 PROCEDURE — 99214 OFFICE O/P EST MOD 30 MIN: CPT | Performed by: INTERNAL MEDICINE

## 2019-01-18 PROCEDURE — G0463 HOSPITAL OUTPT CLINIC VISIT: HCPCS | Performed by: INTERNAL MEDICINE

## 2019-01-18 PROCEDURE — 90670 PCV13 VACCINE IM: CPT | Performed by: INTERNAL MEDICINE

## 2019-01-18 NOTE — TELEPHONE ENCOUNTER
Discussed below w/ Raghavendra Waller. Explained O2 sat was only taken while pt on O2 @ 2 L, so pt will need an appt if they need additional testing. She voiced understanding. Per Raghavendra Waller she will have pt call for an appt.  Confirmed that she has office notes from 11/14/1

## 2019-01-18 NOTE — PROGRESS NOTES
HPI:    Patient ID: Belgica Lara is a 70year old female. Hypertension   This is a chronic problem. The current episode started more than 1 year ago. The problem is unchanged. The problem is controlled.  Pertinent negatives include no anxiety, blurred light-headedness, numbness and headaches.             Current Outpatient Medications:  Pantoprazole Sodium 40 MG Oral Tab EC TAKE 1 TABLET BY MOUTH EVERY MORNING BEFORE BREAKFAST Disp: 90 tablet Rfl: 0    MG Oral Cap TAKE ONE CAPSULE BY MOUTH TWICE D Use every 6 hours as needed Disp: 1 kit Rfl: 0   Respiratory Therapy Supplies (NEBULIZER/TUBING/MOUTHPIECE) Does not apply Kit Use every 6 hours as needed Disp: 1 kit Rfl: 3     Allergies:  Shellfish Allergy       ANAPHYLAXIS    Comment:Other reaction(s):

## 2019-01-21 NOTE — TELEPHONE ENCOUNTER
Pls see TE 1/15/19. Pt requests appt since oximetry testing was not done during her last office visit. Sched pt 1/28/19 11:45 am [facility]. Appt info given. Reassured pt. She voiced understanding.

## 2019-01-28 ENCOUNTER — OFFICE VISIT (OUTPATIENT)
Dept: PULMONOLOGY | Facility: CLINIC | Age: 69
End: 2019-01-28
Payer: MEDICARE

## 2019-01-28 VITALS
OXYGEN SATURATION: 94 % | SYSTOLIC BLOOD PRESSURE: 140 MMHG | HEART RATE: 68 BPM | WEIGHT: 183 LBS | HEIGHT: 64 IN | DIASTOLIC BLOOD PRESSURE: 82 MMHG | BODY MASS INDEX: 31.24 KG/M2

## 2019-01-28 DIAGNOSIS — J96.11 CHRONIC RESPIRATORY FAILURE WITH HYPOXIA (HCC): ICD-10-CM

## 2019-01-28 DIAGNOSIS — J44.9 CHRONIC OBSTRUCTIVE PULMONARY DISEASE, UNSPECIFIED COPD TYPE (HCC): Primary | ICD-10-CM

## 2019-01-28 PROCEDURE — G0463 HOSPITAL OUTPT CLINIC VISIT: HCPCS | Performed by: INTERNAL MEDICINE

## 2019-01-28 PROCEDURE — 99214 OFFICE O/P EST MOD 30 MIN: CPT | Performed by: INTERNAL MEDICINE

## 2019-01-28 PROCEDURE — 94761 N-INVAS EAR/PLS OXIMETRY MLT: CPT | Performed by: INTERNAL MEDICINE

## 2019-01-28 NOTE — PROGRESS NOTES
HPI:    Patient ID: Belgica Lara is a 70year old female. HPI  Doing well   No sob more then baseline   No cough  No sputum   No cp   Better with home O2 use   Review of Systems   Constitutional: Negative for fatigue and fever.    Respiratory: Positive LIDOCAINE 5 % External Patch APPLY 1 PATCH EXTERNALLY TO THE SKIN EVERY DAY Disp: 30 patch Rfl: 5   Nebulizers (VIOS AEROSOL DELIVERY SYSTEM) Does not apply Misc U UTD Q 6 HOURS PRN Disp:  Rfl: 0   Respiratory Therapy Supplies (NEBULIZER COMPRESSOR) Does No prescriptions requested or ordered in this encounter       Imaging & Referrals:  None       LD#[id number]

## 2019-01-29 ENCOUNTER — HOSPITAL ENCOUNTER (OUTPATIENT)
Dept: MAMMOGRAPHY | Facility: HOSPITAL | Age: 69
Discharge: HOME | End: 2019-01-29
Attending: INTERNAL MEDICINE
Payer: MEDICARE

## 2019-01-29 DIAGNOSIS — N60.11 BILATERAL FIBROCYSTIC BREAST CHANGES: ICD-10-CM

## 2019-01-29 DIAGNOSIS — N60.12 BILATERAL FIBROCYSTIC BREAST CHANGES: ICD-10-CM

## 2019-01-29 PROCEDURE — 77062 BREAST TOMOSYNTHESIS BI: CPT | Performed by: INTERNAL MEDICINE

## 2019-01-29 PROCEDURE — 77066 DX MAMMO INCL CAD BI: CPT | Performed by: INTERNAL MEDICINE

## 2019-03-06 RX ORDER — CARVEDILOL 25 MG/1
TABLET ORAL
Qty: 180 TABLET | Refills: 3 | Status: SHIPPED | OUTPATIENT
Start: 2019-03-06 | End: 2020-01-17

## 2019-03-21 ENCOUNTER — TELEPHONE (OUTPATIENT)
Dept: PULMONOLOGY | Facility: CLINIC | Age: 69
End: 2019-03-21

## 2019-03-21 NOTE — TELEPHONE ENCOUNTER
Pt is overdue for chest CT. Letter sent 1.14.19. Called the patient. LDM with scheduling instructions.

## 2019-03-21 NOTE — TELEPHONE ENCOUNTER
Sent to on call as DW out of town and instructions to send to on call     Review pended refill request as it does not fall under a protocol.     Last Rx: 12-27-18  LOV: 1-18-19

## 2019-03-23 NOTE — TELEPHONE ENCOUNTER
Dr Curtis Peter for Dr eMlissa Hsu, please.  Please advise on refill request.    Hypertensive Medications  Protocol Criteria:  · Appointment scheduled in the past 6 months or in the next 3 months  · BMP or CMP in the past 12 months  · Creatinine result < 2  Recent

## 2019-03-24 RX ORDER — HYDROCHLOROTHIAZIDE 12.5 MG/1
CAPSULE, GELATIN COATED ORAL
Qty: 90 CAPSULE | Refills: 1 | Status: SHIPPED | OUTPATIENT
Start: 2019-03-24 | End: 2019-07-12

## 2019-03-25 RX ORDER — PANTOPRAZOLE SODIUM 40 MG/1
TABLET, DELAYED RELEASE ORAL
Qty: 90 TABLET | Refills: 3 | Status: SHIPPED | OUTPATIENT
Start: 2019-03-25 | End: 2020-03-15

## 2019-03-26 ENCOUNTER — TELEPHONE (OUTPATIENT)
Dept: FAMILY MEDICINE CLINIC | Facility: CLINIC | Age: 69
End: 2019-03-26

## 2019-03-26 RX ORDER — HYDROXYZINE HYDROCHLORIDE 25 MG/1
25 TABLET, FILM COATED ORAL 3 TIMES DAILY PRN
Qty: 30 TABLET | Refills: 3 | Status: SHIPPED | OUTPATIENT
Start: 2019-03-26 | End: 2019-05-09

## 2019-03-26 NOTE — TELEPHONE ENCOUNTER
Patient has complaints of itching all over, is bruised from her blood thinner from scratching, offered her an appt, but refused, just wants a prescription sent to pharmacy.       [facility]

## 2019-04-03 ENCOUNTER — HOSPITAL ENCOUNTER (OUTPATIENT)
Dept: CT IMAGING | Facility: HOSPITAL | Age: 69
Discharge: HOME | End: 2019-04-03
Attending: INTERNAL MEDICINE
Payer: MEDICARE

## 2019-04-03 ENCOUNTER — HOSPITAL ENCOUNTER (OUTPATIENT)
Dept: RESPIRATORY THERAPY | Facility: HOSPITAL | Age: 69
Discharge: HOME | End: 2019-04-03
Attending: INTERNAL MEDICINE
Payer: MEDICARE

## 2019-04-03 DIAGNOSIS — J44.9 CHRONIC OBSTRUCTIVE PULMONARY DISEASE, UNSPECIFIED COPD TYPE (HCC): ICD-10-CM

## 2019-04-03 DIAGNOSIS — Z87.891 FORMER SMOKER: ICD-10-CM

## 2019-04-03 PROCEDURE — 94726 PLETHYSMOGRAPHY LUNG VOLUMES: CPT | Performed by: INTERNAL MEDICINE

## 2019-04-03 PROCEDURE — 94729 DIFFUSING CAPACITY: CPT | Performed by: INTERNAL MEDICINE

## 2019-04-03 PROCEDURE — 94060 EVALUATION OF WHEEZING: CPT | Performed by: INTERNAL MEDICINE

## 2019-04-13 DIAGNOSIS — R91.1 LUNG NODULE: Primary | ICD-10-CM

## 2019-04-14 RX ORDER — CLOPIDOGREL BISULFATE 75 MG/1
TABLET ORAL
Qty: 90 TABLET | Refills: 3 | Status: SHIPPED | OUTPATIENT
Start: 2019-04-14 | End: 2020-05-15

## 2019-04-14 RX ORDER — ATORVASTATIN CALCIUM 40 MG/1
TABLET, FILM COATED ORAL
Qty: 90 TABLET | Refills: 0 | Status: SHIPPED | OUTPATIENT
Start: 2019-04-14 | End: 2019-07-12

## 2019-04-14 RX ORDER — IRBESARTAN 300 MG/1
TABLET ORAL
Qty: 90 TABLET | Refills: 0 | Status: SHIPPED | OUTPATIENT
Start: 2019-04-14 | End: 2019-10-29

## 2019-04-15 NOTE — PROCEDURES
[facility]    PFT Interpretation    Deward Labor May    1950 MRN [id number]   Height  590 Age 70year old   Weight  183 lbs  Sex Female         Spirometry:     FEV1 1.01 L which is 45%  FEV1/FVC 45% which is severely reduced    No

## 2019-04-15 NOTE — ADDENDUM NOTE
Encounter addended by: Isiah Talamantes MD on: 4/15/2019 6:13 PM   Actions taken: Sign clinical note, Charge Capture section accepted

## 2019-04-26 ENCOUNTER — OFFICE VISIT (OUTPATIENT)
Dept: INTERNAL MEDICINE CLINIC | Facility: CLINIC | Age: 69
End: 2019-04-26
Payer: MEDICARE

## 2019-04-26 ENCOUNTER — LAB ENCOUNTER (OUTPATIENT)
Dept: LAB | Age: 69
End: 2019-04-26
Attending: INTERNAL MEDICINE
Payer: MEDICARE

## 2019-04-26 VITALS
TEMPERATURE: 97 F | WEIGHT: 170 LBS | HEIGHT: 61.75 IN | DIASTOLIC BLOOD PRESSURE: 72 MMHG | SYSTOLIC BLOOD PRESSURE: 114 MMHG | BODY MASS INDEX: 31.28 KG/M2 | HEART RATE: 72 BPM

## 2019-04-26 DIAGNOSIS — J43.1 PANLOBULAR EMPHYSEMA (HCC): ICD-10-CM

## 2019-04-26 DIAGNOSIS — I10 ESSENTIAL HYPERTENSION, BENIGN: Primary | ICD-10-CM

## 2019-04-26 DIAGNOSIS — E78.00 PURE HYPERCHOLESTEROLEMIA: ICD-10-CM

## 2019-04-26 DIAGNOSIS — I10 ESSENTIAL HYPERTENSION, BENIGN: ICD-10-CM

## 2019-04-26 DIAGNOSIS — R56.9 SEIZURE (HCC): ICD-10-CM

## 2019-04-26 DIAGNOSIS — R73.9 HYPERGLYCEMIA: ICD-10-CM

## 2019-04-26 PROCEDURE — 80185 ASSAY OF PHENYTOIN TOTAL: CPT

## 2019-04-26 PROCEDURE — 36415 COLL VENOUS BLD VENIPUNCTURE: CPT

## 2019-04-26 PROCEDURE — 85027 COMPLETE CBC AUTOMATED: CPT

## 2019-04-26 PROCEDURE — 36415 COLL VENOUS BLD VENIPUNCTURE: CPT | Performed by: INTERNAL MEDICINE

## 2019-04-26 PROCEDURE — 85025 COMPLETE CBC W/AUTO DIFF WBC: CPT

## 2019-04-26 PROCEDURE — 99214 OFFICE O/P EST MOD 30 MIN: CPT | Performed by: INTERNAL MEDICINE

## 2019-04-26 PROCEDURE — 80061 LIPID PANEL: CPT | Performed by: INTERNAL MEDICINE

## 2019-04-26 PROCEDURE — 80053 COMPREHEN METABOLIC PANEL: CPT

## 2019-04-26 PROCEDURE — 85007 BL SMEAR W/DIFF WBC COUNT: CPT

## 2019-04-26 PROCEDURE — G0463 HOSPITAL OUTPT CLINIC VISIT: HCPCS | Performed by: INTERNAL MEDICINE

## 2019-04-26 PROCEDURE — 83036 HEMOGLOBIN GLYCOSYLATED A1C: CPT | Performed by: INTERNAL MEDICINE

## 2019-04-26 RX ORDER — METHYLPREDNISOLONE 4 MG/1
TABLET ORAL
Qty: 1 KIT | Refills: 0 | Status: SHIPPED | OUTPATIENT
Start: 2019-04-26 | End: 2019-05-02

## 2019-04-26 RX ORDER — VENLAFAXINE HYDROCHLORIDE 150 MG/1
CAPSULE, EXTENDED RELEASE ORAL
Qty: 90 CAPSULE | Refills: 3 | Status: SHIPPED | OUTPATIENT
Start: 2019-04-26 | End: 2020-04-07

## 2019-04-26 RX ORDER — PHENYTOIN SODIUM 100 MG/1
CAPSULE, EXTENDED RELEASE ORAL
Qty: 270 CAPSULE | Refills: 3 | Status: SHIPPED | OUTPATIENT
Start: 2019-04-26 | End: 2020-04-20

## 2019-04-26 RX ORDER — CLOBETASOL PROPIONATE 0.5 MG/G
CREAM TOPICAL
Refills: 2 | COMMUNITY
Start: 2019-04-24 | End: 2019-09-20

## 2019-04-26 NOTE — PROGRESS NOTES
HPI:    Patient ID: Sania Plaza is a 70year old female. HPIpatient is doing well, no chest pain , no increased shortness of breath , has lost 15 lbs.      Review of Systems   Constitutional: Negative for activity change, appetite change, chills, fati 12.5 MG Oral Cap TAKE 1 CAPSULE BY MOUTH EVERY DAY Disp: 90 capsule Rfl: 1   CARVEDILOL 25 MG Oral Tab TAKE 1 TABLET BY MOUTH TWICE DAILY WITH FOOD Disp: 180 tablet Rfl: 3    MG Oral Cap TAKE ONE CAPSULE BY MOUTH TWICE DAILY AS NEEDED Disp: 60 capsu person, place, and time. She appears well-developed. HENT:   Mouth/Throat: Oropharynx is clear and moist.   Eyes: Pupils are equal, round, and reactive to light. EOM are normal.   Neck: Normal range of motion. No thyromegaly present.    Cardiovascular: No changes. Hyperglycemia  Glucose is controlled, labs today . Panlobular emphysema (HCC)  Stable, will have a CT lung soon for follow up for a nodule. Pure hypercholesterolemia  Labs today .  Stable     Seizure ([address]  Will check dilantin level, no

## 2019-05-02 ENCOUNTER — TELEPHONE (OUTPATIENT)
Dept: FAMILY MEDICINE CLINIC | Facility: CLINIC | Age: 69
End: 2019-05-02

## 2019-05-02 RX ORDER — METHYLPREDNISOLONE 4 MG/1
TABLET ORAL
Qty: 1 KIT | Refills: 0 | Status: SHIPPED | OUTPATIENT
Start: 2019-05-02 | End: 2019-06-03

## 2019-05-02 NOTE — TELEPHONE ENCOUNTER
Still having a problem with her itching, finished her Prednisone, but is asking if she could have a refill on it, one more time ? ?       [facility]

## 2019-05-10 RX ORDER — HYDROXYZINE HYDROCHLORIDE 25 MG/1
TABLET, FILM COATED ORAL
Qty: 270 TABLET | Refills: 3 | Status: SHIPPED | OUTPATIENT
Start: 2019-05-10 | End: 2020-03-13

## 2019-05-10 NOTE — TELEPHONE ENCOUNTER
Review pended refill request as it does not fall under a protocol.     Requested Prescriptions     Pending Prescriptions Disp Refills   • HYDROXYZINE HCL 25 MG Oral Tab [Pharmacy Med Name: HYDROXYZINE HCL 25MG TABS (WHITE)] 30 tablet 0     Sig: TAKE 1 TABLE

## 2019-05-13 ENCOUNTER — OFFICE VISIT (OUTPATIENT)
Dept: PULMONOLOGY | Facility: CLINIC | Age: 69
End: 2019-05-13
Payer: MEDICARE

## 2019-05-13 VITALS
DIASTOLIC BLOOD PRESSURE: 71 MMHG | SYSTOLIC BLOOD PRESSURE: 112 MMHG | WEIGHT: 164 LBS | HEART RATE: 62 BPM | BODY MASS INDEX: 28 KG/M2 | HEIGHT: 64 IN | OXYGEN SATURATION: 95 %

## 2019-05-13 DIAGNOSIS — R91.1 LUNG NODULE: ICD-10-CM

## 2019-05-13 DIAGNOSIS — J44.9 CHRONIC OBSTRUCTIVE PULMONARY DISEASE, UNSPECIFIED COPD TYPE (HCC): Primary | ICD-10-CM

## 2019-05-13 PROCEDURE — 99214 OFFICE O/P EST MOD 30 MIN: CPT | Performed by: INTERNAL MEDICINE

## 2019-05-13 PROCEDURE — G0463 HOSPITAL OUTPT CLINIC VISIT: HCPCS | Performed by: INTERNAL MEDICINE

## 2019-05-13 NOTE — PROGRESS NOTES
HPI:    Patient ID: Bryn Wesley is a 70year old female.     HPI  Feeling better and she lost some weight intentionally after she gained it after quitting smoking and feeling better already  No active cough or sputum or hemoptysis  No fever or chills  No TWICE DAILY AS NEEDED Disp: 60 capsule Rfl: 11   ALPRAZolam 0.25 MG Oral Tab TAKE 1 TABLET BY MOUTH EVERY MORNING AND 2 TABLETS BY MOUTH EVERY EVENING Disp: 270 tablet Rfl: 1   SPIRIVA RESPIMAT 2.5 MCG/ACT Inhalation Aero Soln INHALE 1 PUFF INTO THE LUNGS nodule      1- severe COPD ( FEV1 47 % and DLCO 47 % on PFTs in 2014  )   history of heavy smoking quit in 2013 (50-pack-year history of smoking )      Now stable / no evidence of exacerbation      spiriva   Dulera   Albuterol prn      2-chronic respirator

## 2019-05-28 RX ORDER — MOMETASONE FUROATE AND FORMOTEROL FUMARATE DIHYDRATE 200; 5 UG/1; UG/1
AEROSOL RESPIRATORY (INHALATION)
Qty: 39 G | Refills: 3 | Status: SHIPPED | OUTPATIENT
Start: 2019-05-28 | End: 2020-05-15

## 2019-06-03 RX ORDER — METHYLPREDNISOLONE 4 MG/1
TABLET ORAL
Qty: 21 TABLET | Refills: 0 | Status: SHIPPED | OUTPATIENT
Start: 2019-06-03 | End: 2020-03-13

## 2019-06-07 ENCOUNTER — TELEPHONE (OUTPATIENT)
Dept: FAMILY MEDICINE CLINIC | Facility: CLINIC | Age: 69
End: 2019-06-07

## 2019-06-07 RX ORDER — PREDNISONE 10 MG/1
TABLET ORAL
Qty: 40 TABLET | Refills: 0 | Status: SHIPPED | OUTPATIENT
Start: 2019-06-07 | End: 2020-03-13

## 2019-06-07 NOTE — TELEPHONE ENCOUNTER
Still having a lot of itching all over her body, would like Prednisone sent to pharmacy.       [facility]

## 2019-06-11 ENCOUNTER — TELEPHONE (OUTPATIENT)
Dept: FAMILY MEDICINE CLINIC | Facility: CLINIC | Age: 69
End: 2019-06-11

## 2019-06-11 RX ORDER — DOXEPIN HYDROCHLORIDE 10 MG/1
10 CAPSULE ORAL NIGHTLY
Qty: 30 CAPSULE | Refills: 1 | Status: SHIPPED | OUTPATIENT
Start: 2019-06-11 | End: 2019-07-11

## 2019-06-11 NOTE — TELEPHONE ENCOUNTER
Patient states she is currently on the Prednisone that was prescribed, but has not helped the itching, wants to know if she could have a higher dose of Prednisone ? ?       ROSE MARIE---KRUPA REYNA

## 2019-06-11 NOTE — TELEPHONE ENCOUNTER
Doesn't have an appointment with the allergist till middle of July, she said she will go crasy by then, wants to increase her Prednisone.

## 2019-06-13 NOTE — TELEPHONE ENCOUNTER
I called patient, she did not  the Doxepin because insurance wasn't covering it because they don't like to give this to the elderly, also because she thought it was not for her.   I explained it was prescribed for her itch and she will pick it up today since it will only cost $20.

## 2019-06-17 RX ORDER — ALPRAZOLAM 0.25 MG/1
TABLET ORAL
Qty: 270 TABLET | Refills: 1 | Status: SHIPPED
Start: 2019-06-17 | End: 2020-03-05

## 2019-06-20 RX ORDER — TIOTROPIUM BROMIDE INHALATION SPRAY 3.12 UG/1
SPRAY, METERED RESPIRATORY (INHALATION)
Qty: 4 G | Refills: 0 | Status: SHIPPED | OUTPATIENT
Start: 2019-06-20 | End: 2019-07-19

## 2019-07-11 RX ORDER — DOXEPIN HYDROCHLORIDE 10 MG/1
CAPSULE ORAL
Qty: 90 CAPSULE | Refills: 1 | Status: SHIPPED | OUTPATIENT
Start: 2019-07-11 | End: 2020-01-06

## 2019-07-12 RX ORDER — HYDROCHLOROTHIAZIDE 12.5 MG/1
CAPSULE, GELATIN COATED ORAL
Qty: 90 CAPSULE | Refills: 3 | Status: SHIPPED | OUTPATIENT
Start: 2019-07-12 | End: 2020-09-24

## 2019-07-12 RX ORDER — MONTELUKAST SODIUM 10 MG/1
TABLET ORAL
Qty: 90 TABLET | Refills: 3 | Status: SHIPPED | OUTPATIENT
Start: 2019-07-12 | End: 2019-10-10

## 2019-07-12 RX ORDER — ATORVASTATIN CALCIUM 40 MG/1
TABLET, FILM COATED ORAL
Qty: 90 TABLET | Refills: 3 | Status: SHIPPED | OUTPATIENT
Start: 2019-07-12 | End: 2020-08-05

## 2019-07-12 RX ORDER — IRBESARTAN 300 MG/1
TABLET ORAL
Qty: 90 TABLET | Refills: 3 | Status: SHIPPED | OUTPATIENT
Start: 2019-07-12 | End: 2020-07-15

## 2019-07-19 ENCOUNTER — OFFICE VISIT (OUTPATIENT)
Dept: INTERNAL MEDICINE CLINIC | Facility: CLINIC | Age: 69
End: 2019-07-19
Payer: MEDICARE

## 2019-07-19 VITALS
SYSTOLIC BLOOD PRESSURE: 130 MMHG | HEIGHT: 61.75 IN | DIASTOLIC BLOOD PRESSURE: 70 MMHG | WEIGHT: 164 LBS | TEMPERATURE: 98 F | BODY MASS INDEX: 30.18 KG/M2 | HEART RATE: 60 BPM

## 2019-07-19 DIAGNOSIS — J43.1 PANLOBULAR EMPHYSEMA (HCC): ICD-10-CM

## 2019-07-19 DIAGNOSIS — I10 ESSENTIAL HYPERTENSION, BENIGN: Primary | ICD-10-CM

## 2019-07-19 PROCEDURE — 99214 OFFICE O/P EST MOD 30 MIN: CPT | Performed by: INTERNAL MEDICINE

## 2019-07-19 PROCEDURE — G0463 HOSPITAL OUTPT CLINIC VISIT: HCPCS | Performed by: INTERNAL MEDICINE

## 2019-07-19 RX ORDER — TOBRAMYCIN AND DEXAMETHASONE 3; 1 MG/ML; MG/ML
SUSPENSION/ DROPS OPHTHALMIC
Refills: 0 | COMMUNITY
Start: 2019-07-02 | End: 2019-08-20

## 2019-07-19 NOTE — PROGRESS NOTES
Hypertension   This is a chronic problem. The current episode started more than 1 year ago. The problem is unchanged. The problem is controlled.  Pertinent negatives include no anxiety, blurred vision, chest pain, headaches, malaise/fatigue, neck pain, or Current Outpatient Medications:  ATORVASTATIN 40 MG Oral Tab TAKE 1 TABLET(40 MG) BY MOUTH EVERY NIGHT Disp: 90 tablet Rfl: 3   MONTELUKAST SODIUM 10 MG Oral Tab TAKE 1 TABLET(10 MG) BY MOUTH DAILY Disp: 90 tablet Rfl: 3   HYDROCHLOROTHIAZIDE 12.5 MG Ora Pack TAKE AS DIRECTED Disp: 21 tablet Rfl: 0   Clobetasol Propionate 0.05 % External Cream MILKA A THIN LAYER AA BID UTD Disp:  Rfl: 2   VALVED HOLDING CHAMBER Does not apply Device USE WITH PROAIR INHALER AS DIRECTED Disp: 1 Device Rfl: 0   PROAIR  ( muscle tone. Coordination normal.   Skin: Skin is warm. No rash noted. No erythema. No pallor. Psychiatric: She has a normal mood and affect. Her behavior is normal. Judgment and thought content normal.   Vitals reviewed.              ASSESSMENT/PLAN:   E

## 2019-08-01 ENCOUNTER — TELEPHONE (OUTPATIENT)
Dept: PULMONOLOGY | Facility: CLINIC | Age: 69
End: 2019-08-01

## 2019-08-01 NOTE — TELEPHONE ENCOUNTER
History    Isaias Mcclure is a 68 year old male who presents for follow-up type 2 diabetes mellitus. Less than optimal control. Complaining of right knee pain.    I have reviewed the patient's medications and allergies, past medical, surgical, social and family history, updating these as appropriate. See Histories section of the EMR for a display of this information.    REVIEW OF SYSTEMS  Cardiovascular:  No chest pain or palpitations.  Pulmonary:  No cough or shortness of breath.  GI:  No diarrhea, constipation, or abdominal pain.   :  No frequency, urgency or dysuria.   Endocrine:  No polyuria, polydipsia, heat or cold intolerance.  Neurologic:  No headache or focal weakness.   Constitutional:   No night sweats or weight loss.  Musculoskeletal:  As above  Dermatologic:  No rash.  Hematologic:  No lymphadenopathy or easy bruisability.  Eyes:  No vision problems.  ENT:  No hoarseness or hearing loss.  Psychiatric:  No depression or anxiety   The remainder of the review of systems is negative.      PHYSICAL EXAM  Visit Vitals   • /88 (BP [place] [facility] [place] Position: Sitting, Cuff Size: Regular)   • Pulse 69  Comment: monitor   • Ht 5' 5.25\" (1.657 m)   • Wt 93.4 kg   • SpO2 94%  Comment: on room air at rest   • BMI 33.98 kg/m2     Constitutional:  Well developed, well nourished, no acute distress, non-toxic appearance   HEENT:  TMs canals clear.  Mouth and throat clear. Pupils equal round and react to light and accommodation. No scleral icterus. Conjunctiva clear.   Neck: No masses or asymmetry.   Thyroid:  No enlargement or tenderness.   Lymphatic: No cervical, supraclavicular, or inguinal adenopathy.  Lungs: Clear to auscultation and percussion. Respirations unlabored.  Cardiovascular: No JVD, no carotid bruits. Heart regular rate and rhythm. No S3-S4 murmur or rub.  Abdomen: Soft without masses, tenderness, hepatosplenomegaly or bruits. Bowel sounds are normal.   Extremities:  Without cyanosis or  Mailed letter and orders to pt edema.  Musculoskeletal: No swelling, no tenderness, no deformities. Back- no tenderness.  Integument:  Well hydrated, no rash   Neurologic:  Alert & oriented x 3, CN 2-12 normal, normal motor function, normal sensory function, no focal deficits noted   Psychiatric:  Speech and behavior appropriate   Normal Bilateral Foot Exam: Skin integrity is normal. Dorsalis pedis and posterior tibial pulses are present.  Pressure sensation using the [place] monofilament is present.    Lab Services on 03/08/2017   Component Date Value Ref Range Status   • Fasting Status 03/08/2017 12  hrs Final   • Sodium 03/08/2017 141  135 - 145 mmol/L Final   • Potassium 03/08/2017 4.9  3.4 - 5.1 mmol/L Final   • Chloride 03/08/2017 101  98 - 107 mmol/L Final   • Carbon Dioxide 03/08/2017 30  21 - 32 mmol/L Final   • Anion Gap 03/08/2017 15  10 - 20 mmol/L Final   • Glucose 03/08/2017 192* 65 - 99 mg/dL Final   • BUN 03/08/2017 10  6 - 20 mg/dL Final   • Creatinine 03/08/2017 1.02  0.67 - 1.17 mg/dL Final   • GFR Estimate,  03/08/2017 87   Final    Comment: In patients with known chronic kidney disease, the stage of disease based on eGFR is interpreted as follows:  eGFR results 60-89 mL/min/1.73m2 = Stage II CKD (chronic kidney disease), or mild kidney disease.     • GFR Estimate, Non  03/08/2017 75   Final    Comment: In patients with known chronic kidney disease, the stage of disease based on eGFR is interpreted as follows:  eGFR results 60-89 mL/min/1.73m2 = Stage II CKD (chronic kidney disease), or mild kidney disease.     • BUN/Creatinine Ratio 03/08/2017 10  7 - 25 Final   • CALCIUM 03/08/2017 9.1  8.4 - 10.2 mg/dL Final   • Hemoglobin A1C 03/08/2017 8.1* 4.5 - 5.6 % Final    Comment:    ----DIABETIC SCREENING---  NON DIABETIC                 <5.7%  INCREASED RISK                5.7-6.4%  DIAGNOSTIC FOR DIABETES      >6.4%     ----DIABETIC CONTROL---     A1C%           eAG mg/dL  6.0             126  6.5            140  7.0            154  7.5            169  8.0            183  8.5            197  9.0            212  9.5            226  10.0           240         ASSESSMENT:  1. Essential hypertension    2. Hyperlipidemia, unspecified hyperlipidemia type    3. Chronic kidney disease, unspecified stage    4. Type 2 diabetes mellitus with complication, without long-term current use of insulin    5. Screening for colon cancer    6. Need for hepatitis C screening test    7.          PLAN:  Orders Placed This Encounter   • Basic Metabolic Panel   • SGOT   • Creatine Kinase   • Glycohemoglobin   • Microalbumin Urine Random   • Hepatitis C Antibody with Reflex   • SERVICE TO GASTROENTEROLOGY     We'll attempt to be more compliant with his diabetic medication    Return in about 3 months (around 6/14/2017).

## 2019-08-08 ENCOUNTER — TELEPHONE (OUTPATIENT)
Dept: PULMONOLOGY | Facility: CLINIC | Age: 69
End: 2019-08-08

## 2019-08-08 NOTE — TELEPHONE ENCOUNTER
Pt contacted. Name and  verified. Pt states she is allergic to shellfish. I informed Pt, CT ordered is without contrast, and her shellfish allergy is listed on her file. Pt verbalized understanding. CT scheduled for .

## 2019-08-13 NOTE — PRE-SEDATION ASSESSMENT
Physician Pre-Sedation Assessment    Pre-Sedation Assessment:    Sedation History: Previous Sedation with No Complications and Airway Assessed    Cardiac: normal S1, S2  Respiratory: breath sounds clear bilaterally   Abdomen: soft, BS (+), non-tender    AS
home w/ outpatient services/home PT vs outpatient/home w/ home PT

## 2019-08-20 ENCOUNTER — TELEPHONE (OUTPATIENT)
Dept: FAMILY MEDICINE CLINIC | Facility: CLINIC | Age: 69
End: 2019-08-20

## 2019-08-20 RX ORDER — TOBRAMYCIN AND DEXAMETHASONE 3; 1 MG/ML; MG/ML
SUSPENSION/ DROPS OPHTHALMIC
Qty: 5 ML | Refills: 1 | Status: SHIPPED | OUTPATIENT
Start: 2019-08-20 | End: 2019-10-13

## 2019-09-16 RX ORDER — TIOTROPIUM BROMIDE INHALATION SPRAY 3.12 UG/1
SPRAY, METERED RESPIRATORY (INHALATION)
Qty: 12 G | Refills: 3 | Status: SHIPPED | OUTPATIENT
Start: 2019-09-16 | End: 2020-09-11

## 2019-09-18 ENCOUNTER — OFFICE VISIT (OUTPATIENT)
Dept: PULMONOLOGY | Facility: CLINIC | Age: 69
End: 2019-09-18
Payer: MEDICARE

## 2019-09-18 ENCOUNTER — TELEPHONE (OUTPATIENT)
Dept: PULMONOLOGY | Facility: CLINIC | Age: 69
End: 2019-09-18

## 2019-09-18 ENCOUNTER — HOSPITAL ENCOUNTER (OUTPATIENT)
Dept: CT IMAGING | Facility: HOSPITAL | Age: 69
Discharge: HOME | End: 2019-09-18
Attending: INTERNAL MEDICINE | Admitting: INTERNAL MEDICINE
Payer: MEDICARE

## 2019-09-18 VITALS
HEART RATE: 65 BPM | BODY MASS INDEX: 29.02 KG/M2 | DIASTOLIC BLOOD PRESSURE: 78 MMHG | SYSTOLIC BLOOD PRESSURE: 135 MMHG | OXYGEN SATURATION: 98 % | WEIGHT: 170 LBS | HEIGHT: 64 IN

## 2019-09-18 DIAGNOSIS — R91.1 LUNG NODULE: ICD-10-CM

## 2019-09-18 DIAGNOSIS — J44.9 CHRONIC OBSTRUCTIVE PULMONARY DISEASE, UNSPECIFIED COPD TYPE (HCC): Primary | ICD-10-CM

## 2019-09-18 DIAGNOSIS — R91.8 PULMONARY NODULES: ICD-10-CM

## 2019-09-18 PROCEDURE — 71250 CT THORAX DX C-: CPT | Performed by: INTERNAL MEDICINE

## 2019-09-18 PROCEDURE — G0463 HOSPITAL OUTPT CLINIC VISIT: HCPCS | Performed by: INTERNAL MEDICINE

## 2019-09-18 PROCEDURE — 99214 OFFICE O/P EST MOD 30 MIN: CPT | Performed by: INTERNAL MEDICINE

## 2019-09-18 NOTE — PROGRESS NOTES
HPI:    Patient ID: Deep Pérez is a 78year old female.     HPI    Review of Systems         Current Outpatient Medications:  SPIRIVA RESPIMAT 2.5 MCG/ACT Inhalation Aero Soln INHALE 1 PUFF INTO THE LUNGS TWICE DAILY Disp: 12 g Rfl: 3   tobramycin-dexam BEFORE BREAKFAST Disp: 90 tablet Rfl: 3   CARVEDILOL 25 MG Oral Tab TAKE 1 TABLET BY MOUTH TWICE DAILY WITH FOOD Disp: 180 tablet Rfl: 3    MG Oral Cap TAKE ONE CAPSULE BY MOUTH TWICE DAILY AS NEEDED Disp: 60 capsule Rfl: 11   VALVED HOLDING CHAMBER case in chest tumor board next week  Will call the patient with the PET scan and outcome of the chest tumor board discussion    F/u in 2-3 months                        Meds This Visit:  Requested Prescriptions      No prescriptions requested or ordered in

## 2019-09-18 NOTE — TELEPHONE ENCOUNTER
Per AR he has order for Pt PET scan. Called pt to inform her of this left message and also sent Mychart message regarding this.

## 2019-09-19 ENCOUNTER — TELEPHONE (OUTPATIENT)
Dept: FAMILY MEDICINE CLINIC | Facility: CLINIC | Age: 69
End: 2019-09-19

## 2019-09-19 NOTE — TELEPHONE ENCOUNTER
Test was ordered by Dr Renée Gunter.   I gave her the number for scheduling and asked her to call them to inform them of her allergy

## 2019-09-19 NOTE — TELEPHONE ENCOUNTER
Pt scheduled PET SCAN for 9/26/19 and states she is allergic to iodine and shell fish. Pt inquiring if it is ok for her to take PET SCAN.    Please call [phone]

## 2019-09-19 NOTE — TELEPHONE ENCOUNTER
Patient is having a Pet-Scan done, she is allergic to Capri Karla is concerned about the iodine while doing the scan ? ?

## 2019-09-19 NOTE — TELEPHONE ENCOUNTER
Who ordered it ? The ordering physician needs to be made aware,  The institution where it is being done would most likely have a protocol ( steroids tylenol and benadryl) if they feel it is necessary .

## 2019-09-20 ENCOUNTER — TELEPHONE (OUTPATIENT)
Dept: PULMONOLOGY | Facility: CLINIC | Age: 69
End: 2019-09-20

## 2019-09-20 NOTE — TELEPHONE ENCOUNTER
Nuclear medicine contacted, no iodine contrast is used. They use a \"radio pharmaceutical\" substance which is safe for pt's with iodine allergies. Pt notified with understanding.

## 2019-09-21 RX ORDER — CLOBETASOL PROPIONATE 0.5 MG/G
CREAM TOPICAL
Qty: 60 G | Refills: 0 | Status: SHIPPED | OUTPATIENT
Start: 2019-09-21 | End: 2019-10-04

## 2019-09-24 ENCOUNTER — TELEPHONE (OUTPATIENT)
Dept: PULMONOLOGY | Facility: CLINIC | Age: 69
End: 2019-09-24

## 2019-09-24 DIAGNOSIS — R91.1 LUNG NODULE: Primary | ICD-10-CM

## 2019-09-24 NOTE — TELEPHONE ENCOUNTER
Telephone order will read back from Dr. Vallejo Arm referral needs to be entered for pt to see Dr. Dani Berrios for lung nodule as pt was discussed at tumor board today.   Per Dr. Vallejo Arm he left message for pt to call back and he will try calling pt again

## 2019-09-24 NOTE — TELEPHONE ENCOUNTER
I tried to call the patient again still no answer  Please refer the patient to Dr. Vonnie Beebe , he is already aware about her     Thank you

## 2019-09-25 NOTE — TELEPHONE ENCOUNTER
I spoke to the patient in length  Patient going to have a PET scan tomorrow  Patient going to see the surgeon in couple weeks

## 2019-09-26 ENCOUNTER — HOSPITAL ENCOUNTER (OUTPATIENT)
Dept: NUCLEAR MEDICINE | Facility: HOSPITAL | Age: 69
Discharge: HOME | End: 2019-09-26
Attending: INTERNAL MEDICINE
Payer: MEDICARE

## 2019-09-26 DIAGNOSIS — R91.1 LUNG NODULE: ICD-10-CM

## 2019-09-26 LAB — GLUCOSE BLD-MCNC: 105 MG/DL (ref 70–99)

## 2019-09-26 PROCEDURE — 78815 PET IMAGE W/CT SKULL-THIGH: CPT | Performed by: INTERNAL MEDICINE

## 2019-09-26 PROCEDURE — 82962 GLUCOSE BLOOD TEST: CPT

## 2019-09-27 ENCOUNTER — APPOINTMENT (OUTPATIENT)
Dept: HEMATOLOGY/ONCOLOGY | Facility: HOSPITAL | Age: 69
End: 2019-09-27
Attending: THORACIC SURGERY (CARDIOTHORACIC VASCULAR SURGERY)
Payer: MEDICARE

## 2019-09-27 NOTE — TELEPHONE ENCOUNTER
Feel free to read conclusion to her. My opinion based on the PET Only- this may or may not be cancer.  She should f/u with Dr Lea Donis as scheduled to discuss with him,

## 2019-09-27 NOTE — TELEPHONE ENCOUNTER
Pt requesting PET scan results. Pt informed Dr. John Agarwal is not in the office today and results request will be sent to [facility]. Pt states she has scheduled an appt with Dr. Dereck Soulier in October.

## 2019-09-27 NOTE — TELEPHONE ENCOUNTER
Pt informed of Dr. Avelino Ramírez message/results below. PET scan results read to pt. Pt verbalized understanding.

## 2019-10-01 ENCOUNTER — TELEPHONE (OUTPATIENT)
Dept: FAMILY MEDICINE CLINIC | Facility: CLINIC | Age: 69
End: 2019-10-01

## 2019-10-01 RX ORDER — CEFUROXIME AXETIL 500 MG/1
500 TABLET ORAL EVERY 12 HOURS SCHEDULED
Qty: 6 TABLET | Refills: 0 | Status: SHIPPED | OUTPATIENT
Start: 2019-10-01 | End: 2019-10-29 | Stop reason: ALTCHOICE

## 2019-10-01 RX ORDER — PROMETHAZINE HYDROCHLORIDE AND CODEINE PHOSPHATE 6.25; 1 MG/5ML; MG/5ML
2.5 SYRUP ORAL EVERY 4 HOURS PRN
Qty: 240 ML | Refills: 0 | Status: SHIPPED | OUTPATIENT
Start: 2019-10-01 | End: 2020-02-07

## 2019-10-01 NOTE — TELEPHONE ENCOUNTER
Has been sneezing, coughing, congested, would like a antibiotic and cough meds sent to her pharmacy.         Tim Otero

## 2019-10-04 RX ORDER — CLOBETASOL PROPIONATE 0.5 MG/G
CREAM TOPICAL
Qty: 60 G | Refills: 3 | Status: SHIPPED | OUTPATIENT
Start: 2019-10-04 | End: 2019-11-26

## 2019-10-10 RX ORDER — MONTELUKAST SODIUM 10 MG/1
TABLET ORAL
Qty: 90 TABLET | Refills: 3 | Status: SHIPPED | OUTPATIENT
Start: 2019-10-10 | End: 2020-03-13

## 2019-10-14 RX ORDER — TOBRAMYCIN AND DEXAMETHASONE 3; 1 MG/ML; MG/ML
SUSPENSION/ DROPS OPHTHALMIC
Qty: 5 ML | Refills: 0 | Status: SHIPPED | OUTPATIENT
Start: 2019-10-14 | End: 2020-03-13

## 2019-10-15 ENCOUNTER — OFFICE VISIT (OUTPATIENT)
Dept: HEMATOLOGY/ONCOLOGY | Facility: HOSPITAL | Age: 69
End: 2019-10-15
Attending: THORACIC SURGERY (CARDIOTHORACIC VASCULAR SURGERY)
Payer: MEDICARE

## 2019-10-15 VITALS
WEIGHT: 172 LBS | BODY MASS INDEX: 29.37 KG/M2 | SYSTOLIC BLOOD PRESSURE: 152 MMHG | TEMPERATURE: 98 F | HEIGHT: 64 IN | OXYGEN SATURATION: 96 % | DIASTOLIC BLOOD PRESSURE: 70 MMHG | HEART RATE: 63 BPM | RESPIRATION RATE: 20 BRPM

## 2019-10-15 NOTE — H&P
Thoracic Surgery Consult    Reason for Consultation: RUL nodule    Consulting Physician: Selam Marley    Chief Complaint: \" spot on CT scan.  \"    History of Present Illness: Patient is a 78year old, female with PMH COPD, carotid stenosis s/p CEA in 2008, seiz Tab, TAKE 1 TABLET(300 MG) BY MOUTH EVERY NIGHT, Disp: 90 tablet, Rfl: 3  •  DOXEPIN HCL 10 MG Oral Cap, TAKE 1 CAPSULE(10 MG) BY MOUTH EVERY NIGHT, Disp: 90 capsule, Rfl: 1  •  ALPRAZolam 0.25 MG Oral Tab, TAKE 1 TABLET BY MOUTH EVERY MORNING AND 2 TABLET Respiratory Therapy Supplies (NEBULIZER COMPRESSOR) Does not apply Kit, Use every 6 hours as needed, Disp: 1 kit, Rfl: 0  •  Respiratory Therapy Supplies (NEBULIZER/TUBING/MOUTHPIECE) Does not apply Kit, Use every 6 hours as needed, Disp: 1 kit, Rfl: 3 negatives include:    - No unusual headaches, weakness or numbness  - No chest pain  - No dysphagia  - No leg swelling  - No unusual bone pains  - No unusual weight loss over the last 3 months, fatigue, fevers or night sweats    Objective:    /70 (BP beneficial.    PFTs 4/3/19:  FEV1- pre bronchodilator 45%, post bronchodilator 52%  DLCO- 42%    Assessment:    Patient is a 78year old, female PMH COPD, carotid stenosis s/p CEA in 2008, seizure, and CVA (now on plavix), presents today for evaluation of improved for her to be considered for surgical approach. I recommend Pulmonary Rehab. Therefore Script given for pulmonary rehab and PFTs. (patient would like performed at OSH closer to home).   Repeat the PFTs in 1 month and follow up in clinic after to

## 2019-10-29 ENCOUNTER — LAB ENCOUNTER (OUTPATIENT)
Dept: LAB | Age: 69
End: 2019-10-29
Attending: INTERNAL MEDICINE
Payer: MEDICARE

## 2019-10-29 ENCOUNTER — OFFICE VISIT (OUTPATIENT)
Dept: INTERNAL MEDICINE CLINIC | Facility: CLINIC | Age: 69
End: 2019-10-29
Payer: MEDICARE

## 2019-10-29 VITALS
HEART RATE: 60 BPM | WEIGHT: 172 LBS | DIASTOLIC BLOOD PRESSURE: 60 MMHG | SYSTOLIC BLOOD PRESSURE: 150 MMHG | TEMPERATURE: 98 F | BODY MASS INDEX: 31.65 KG/M2 | HEIGHT: 61.75 IN

## 2019-10-29 DIAGNOSIS — I10 ESSENTIAL HYPERTENSION, BENIGN: Primary | ICD-10-CM

## 2019-10-29 DIAGNOSIS — J43.1 PANLOBULAR EMPHYSEMA (HCC): ICD-10-CM

## 2019-10-29 DIAGNOSIS — R56.9 SEIZURE (HCC): ICD-10-CM

## 2019-10-29 DIAGNOSIS — I10 ESSENTIAL HYPERTENSION, BENIGN: ICD-10-CM

## 2019-10-29 DIAGNOSIS — E78.00 PURE HYPERCHOLESTEROLEMIA: ICD-10-CM

## 2019-10-29 DIAGNOSIS — R73.9 HYPERGLYCEMIA: ICD-10-CM

## 2019-10-29 DIAGNOSIS — R91.1 NODULE OF APEX OF RIGHT LUNG: ICD-10-CM

## 2019-10-29 PROCEDURE — 80185 ASSAY OF PHENYTOIN TOTAL: CPT

## 2019-10-29 PROCEDURE — 90662 IIV NO PRSV INCREASED AG IM: CPT | Performed by: INTERNAL MEDICINE

## 2019-10-29 PROCEDURE — 36415 COLL VENOUS BLD VENIPUNCTURE: CPT

## 2019-10-29 PROCEDURE — 80053 COMPREHEN METABOLIC PANEL: CPT

## 2019-10-29 PROCEDURE — 84439 ASSAY OF FREE THYROXINE: CPT

## 2019-10-29 PROCEDURE — 80061 LIPID PANEL: CPT

## 2019-10-29 PROCEDURE — 99214 OFFICE O/P EST MOD 30 MIN: CPT | Performed by: INTERNAL MEDICINE

## 2019-10-29 PROCEDURE — 85025 COMPLETE CBC W/AUTO DIFF WBC: CPT

## 2019-10-29 PROCEDURE — 84443 ASSAY THYROID STIM HORMONE: CPT

## 2019-10-29 PROCEDURE — 83036 HEMOGLOBIN GLYCOSYLATED A1C: CPT | Performed by: INTERNAL MEDICINE

## 2019-10-29 PROCEDURE — G0008 ADMIN INFLUENZA VIRUS VAC: HCPCS | Performed by: INTERNAL MEDICINE

## 2019-10-29 NOTE — ASSESSMENT & PLAN NOTE
Patient has had a PET CT scan , she has seen thoracic surgery , he ordered a PFT , she had on in April of 2019 , He ordered pulmonary rehab , she couldn't get in until Dec. She is concerned that due to chronic back pain , including thoracic that she would

## 2019-10-29 NOTE — PROGRESS NOTES
HPI:    Patient ID: Isaac Manuel is a 78year old female. HPI  Hypertension   This is a chronic problem. The current episode started more than 1 year ago. The problem is unchanged. The problem is controlled.  Pertinent negatives include no anxiety, tyler Musculoskeletal: Negative for neck pain and neck stiffness. Allergic/Immunologic: Negative for immunocompromised state. Neurological: Negative for dizziness, syncope, facial asymmetry, weakness, light-headedness, numbness and headaches.           VIKI TIMES DAILY FOR 7 DAYS, Disp: 5 mL, Rfl: 0  CLOBETASOL PROPIONATE 0.05 % External Cream, APPLY THIN LAYER TO THE AFFECTED AREA TWICE DAILY AS DIRECTED, Disp: 60 g, Rfl: 3  promethazine-codeine 6.25-10 MG/5ML Oral Syrup, Take 2.5 mL by mouth every 4 (four) respiratory distress. She has decreased breath sounds. She has no wheezes. She has no rales. She exhibits no tenderness. Abdominal: She exhibits no distension and no mass. There is no tenderness. There is no rebound and no guarding.  Musculoskeletal:

## 2019-11-06 NOTE — TELEPHONE ENCOUNTER
I received a call from the thoracic surgeon regarding this pt , and she is high risk for surgery and no surgery to resect this lung nodule is offered to her   Please give the pt follow up with me in 2-3 weeks in my office to f/u this lung nodule   Please a

## 2019-11-06 NOTE — TELEPHONE ENCOUNTER
Spoke with pt. Appointment scheduled for 11/18/19 at 12:15PM. Verified date, time, place, and where to park. Pt verbalized understanding. Spoke with Nikky Sanabria (lung navigator), informed her Dr. Amada Ramírez requesting pt to be added to tumor board this Tuesday.

## 2019-11-06 NOTE — TELEPHONE ENCOUNTER
Spoke with pt. Pt informed case to be discussed again in tumor board (see Dr. Mar Soto message below). Pt verbalized understanding.

## 2019-11-18 ENCOUNTER — TELEPHONE (OUTPATIENT)
Dept: PULMONOLOGY | Facility: CLINIC | Age: 69
End: 2019-11-18

## 2019-11-18 ENCOUNTER — TELEPHONE (OUTPATIENT)
Dept: FAMILY MEDICINE CLINIC | Facility: CLINIC | Age: 69
End: 2019-11-18

## 2019-11-18 RX ORDER — CEFUROXIME AXETIL 500 MG/1
TABLET ORAL
Qty: 6 TABLET | Refills: 0 | OUTPATIENT
Start: 2019-11-18

## 2019-11-18 NOTE — TELEPHONE ENCOUNTER
Peng Cardona      Has Darryle Burr May in the ER, wants to know if their is a previous EKG she had, if so can it be faxed    [phone]---[facility]

## 2019-11-18 NOTE — TELEPHONE ENCOUNTER
Pts niece Bhavin Huerta states that pt is having breathing with chest tightness that is getting worse. Call transferred to RN.

## 2019-11-18 NOTE — TELEPHONE ENCOUNTER
Agree  Patient if she is not feeling well she should be checked in ER  I agree with referring the patient to ER to be evaluated  Thank you

## 2019-11-18 NOTE — TELEPHONE ENCOUNTER
Spoke to pt's niece, Deidre Bhatia. Pt lives with her. She states pt hasn't been feeling well for the last few days and starting having chest tightness yesterday. Experiences chest tightness at rest. She feels cold throughout the night and has body aches.  Doesn'

## 2019-11-19 NOTE — TELEPHONE ENCOUNTER
Pt states she has been admitted to hospital in [place] [address] for double pneumonia. Pt informed to call this office back for hospital f/u appt once she is discharged. Pt verbalized understanding.

## 2019-11-20 NOTE — TELEPHONE ENCOUNTER
Spoke with pt. Pt states she is still in the hospital and had to cancel appt with Dr. Dima Waters. Pt crying on phone, states she doesn't know what is going on and would like to speak to Dr. Dima Waters about her plan of care.  Dr. Dima Waters- Please call patient at 1-

## 2019-11-21 NOTE — TELEPHONE ENCOUNTER
I spoke to the patient and reassured her  For now to finish her therapy for pneumonia at the other hospital  Patient to follow-up with me in 1 to 2 weeks after discharge from the other hospital and I am okay to double book her  Patient voiced understanding

## 2019-11-21 NOTE — TELEPHONE ENCOUNTER
Spoke with pt. Informed pt to call office when discharged from other hospital to schedule follow-up visit. Pt verbalized understanding.

## 2019-11-22 ENCOUNTER — APPOINTMENT (OUTPATIENT)
Dept: RADIATION ONCOLOGY | Facility: HOSPITAL | Age: 69
End: 2019-11-22
Attending: RADIOLOGY
Payer: MEDICARE

## 2019-11-26 ENCOUNTER — TELEPHONE (OUTPATIENT)
Dept: PULMONOLOGY | Facility: CLINIC | Age: 69
End: 2019-11-26

## 2019-11-26 RX ORDER — CLOBETASOL PROPIONATE 0.5 MG/G
CREAM TOPICAL
Qty: 60 G | Refills: 3 | Status: SHIPPED | OUTPATIENT
Start: 2019-11-26 | End: 2020-03-13

## 2019-11-26 NOTE — TELEPHONE ENCOUNTER
See TE 11/18. Follow up booked 12/09. Pt states her symptoms are much improved, denies fever, body aches or chills. Taking abx as prescribed. Advised to notify clinic if symptoms return or worsen, she verbalized understanding of this.

## 2019-11-26 NOTE — TELEPHONE ENCOUNTER
Patient requesting hospital follow up visit, asking for Monday 12/2/19, please call at:[phone],thanks.

## 2019-12-09 ENCOUNTER — OFFICE VISIT (OUTPATIENT)
Dept: PULMONOLOGY | Facility: CLINIC | Age: 69
End: 2019-12-09
Payer: MEDICARE

## 2019-12-09 VITALS
DIASTOLIC BLOOD PRESSURE: 89 MMHG | RESPIRATION RATE: 18 BRPM | BODY MASS INDEX: 32 KG/M2 | SYSTOLIC BLOOD PRESSURE: 157 MMHG | WEIGHT: 172 LBS | OXYGEN SATURATION: 98 % | HEART RATE: 64 BPM

## 2019-12-09 DIAGNOSIS — J96.11 CHRONIC RESPIRATORY FAILURE WITH HYPOXIA (HCC): ICD-10-CM

## 2019-12-09 DIAGNOSIS — J44.9 CHRONIC OBSTRUCTIVE PULMONARY DISEASE, UNSPECIFIED COPD TYPE (HCC): Primary | ICD-10-CM

## 2019-12-09 DIAGNOSIS — R91.1 LUNG NODULE: ICD-10-CM

## 2019-12-09 PROCEDURE — G0463 HOSPITAL OUTPT CLINIC VISIT: HCPCS | Performed by: INTERNAL MEDICINE

## 2019-12-09 PROCEDURE — 99214 OFFICE O/P EST MOD 30 MIN: CPT | Performed by: INTERNAL MEDICINE

## 2019-12-09 NOTE — PROGRESS NOTES
HPI:    Patient ID: Rodrigo Louise is a 78year old female. HPI     Review of Systems   Constitutional: Positive for fatigue. Negative for fever and unexpected weight change. Respiratory: Negative for cough, choking and shortness of breath.     Cardiov MORNING BEFORE BREAKFAST 90 tablet 3   • CARVEDILOL 25 MG Oral Tab TAKE 1 TABLET BY MOUTH TWICE DAILY WITH FOOD 180 tablet 3   •  MG Oral Cap TAKE ONE CAPSULE BY MOUTH TWICE DAILY AS NEEDED 60 capsule 11   • PROAIR  (90 Base) MCG/ACT Inhalati ANAPHYLAXIS    Comment:Other reaction(s): Anaphylaxis  Lisinopril              Coughing    Comment:Other reaction(s): Cough   PHYSICAL EXAM:   Physical Exam   Constitutional: She is oriented to person, place, and time. She appears well-nourished.  No distre COPD and severe scoliosis    Plan :   Follow-up chest CT in January/23/2020 if larger we will discuss biopsy and possible send for CyberKnife     2- severe COPD   FEV1 1.01 L 45 %   DLCO 42 %      history of heavy smoking quit in 2013 (50-pack-year history

## 2019-12-12 ENCOUNTER — TELEPHONE (OUTPATIENT)
Dept: PULMONOLOGY | Facility: CLINIC | Age: 69
End: 2019-12-12

## 2019-12-19 ENCOUNTER — TELEPHONE (OUTPATIENT)
Dept: FAMILY MEDICINE CLINIC | Facility: CLINIC | Age: 69
End: 2019-12-19

## 2019-12-19 RX ORDER — ACETAMINOPHEN AND CODEINE PHOSPHATE 300; 30 MG/1; MG/1
1 TABLET ORAL EVERY 6 HOURS PRN
Qty: 50 TABLET | Refills: 0 | Status: SHIPPED | OUTPATIENT
Start: 2019-12-19 | End: 2020-03-13

## 2019-12-19 RX ORDER — ACETAMINOPHEN AND CODEINE PHOSPHATE 300; 30 MG/1; MG/1
1 TABLET ORAL EVERY 6 HOURS PRN
Qty: 50 TABLET | Refills: 0 | Status: SHIPPED | OUTPATIENT
Start: 2019-12-19 | End: 2019-12-19

## 2019-12-19 NOTE — TELEPHONE ENCOUNTER
Parmjit Monson down in her house was taken by ambulance to the hospital, has a fractured shoulder , they only gave her tylenol, wants to know if she could have pain medication sent to her pharmacy? ?  Asha Reece

## 2020-01-03 NOTE — TELEPHONE ENCOUNTER
To Dr. Adrian Wood. pls advise, thanks in advance.      Requested Prescriptions     Pending Prescriptions Disp Refills   • DOXEPIN HCL 10 MG Oral Cap [Pharmacy Med Name: DOXEPIN 10MG CAPSULES] 90 capsule 1     Sig: TAKE 1 CAPSULE(10 MG) BY MOUTH EVERY [address]

## 2020-01-06 RX ORDER — DOXEPIN HYDROCHLORIDE 10 MG/1
CAPSULE ORAL
Qty: 90 CAPSULE | Refills: 1 | Status: SHIPPED | OUTPATIENT
Start: 2020-01-06 | End: 2020-07-09

## 2020-01-13 ENCOUNTER — TELEPHONE (OUTPATIENT)
Dept: FAMILY MEDICINE CLINIC | Facility: CLINIC | Age: 70
End: 2020-01-13

## 2020-01-13 RX ORDER — TRAMADOL HYDROCHLORIDE 50 MG/1
50 TABLET ORAL EVERY 8 HOURS PRN
Qty: 50 TABLET | Refills: 2 | Status: SHIPPED | OUTPATIENT
Start: 2020-01-13 | End: 2020-03-05

## 2020-01-13 NOTE — TELEPHONE ENCOUNTER
Refill ---Norco--she said this is helping her shoulder more than the Advil in which she was taking so many---only received 6 pills from the pharmacy, would like more if possible.       Kaylynn Holbrook

## 2020-01-13 NOTE — TELEPHONE ENCOUNTER
I spoke with Jorge Yousif and she received the Norco 5-325mg form an oral surgeon for her tooth. She states she sees ortho for her shoulder but that this medication was helping her back pain.   She currently takes many Advil daily for her back and only was giv

## 2020-01-16 RX ORDER — DOCUSATE SODIUM 100 MG/1
CAPSULE, LIQUID FILLED ORAL
Qty: 60 CAPSULE | Refills: 11 | Status: SHIPPED | OUTPATIENT
Start: 2020-01-16 | End: 2020-12-17

## 2020-01-17 RX ORDER — CARVEDILOL 25 MG/1
TABLET ORAL
Qty: 180 TABLET | Refills: 3 | Status: SHIPPED | OUTPATIENT
Start: 2020-01-17 | End: 2020-02-17

## 2020-01-24 ENCOUNTER — HOSPITAL ENCOUNTER (OUTPATIENT)
Dept: CT IMAGING | Facility: HOSPITAL | Age: 70
Discharge: HOME | End: 2020-01-24
Attending: INTERNAL MEDICINE
Payer: MEDICARE

## 2020-01-24 DIAGNOSIS — R91.1 LUNG NODULE: ICD-10-CM

## 2020-01-24 PROCEDURE — 71250 CT THORAX DX C-: CPT | Performed by: INTERNAL MEDICINE

## 2020-02-03 ENCOUNTER — TELEPHONE (OUTPATIENT)
Dept: PULMONOLOGY | Facility: CLINIC | Age: 70
End: 2020-02-03

## 2020-02-03 NOTE — TELEPHONE ENCOUNTER
Candy from [facility] health calling for mutual patient to advise fax sent 1/17; 1/24, and being faxed again today for re certification for oxygen, please provide form and last office notes, please call at:[phone],extention:[phone],thanks.   If your erinn

## 2020-02-03 NOTE — TELEPHONE ENCOUNTER
CMN signed by Dr. Marcy Johansen. CMN and OV notes from 12/9/19 faxed to [facility] [phone]. Fax confirmation received. CMN sent to [facility] for scanning.

## 2020-02-07 RX ORDER — PROMETHAZINE HYDROCHLORIDE AND CODEINE PHOSPHATE 6.25; 1 MG/5ML; MG/5ML
SOLUTION ORAL
Qty: 240 ML | Refills: 0 | Status: SHIPPED | OUTPATIENT
Start: 2020-02-07 | End: 2020-03-13

## 2020-02-07 NOTE — TELEPHONE ENCOUNTER
pls advise, thanks in advance.      Requested Prescriptions     Pending Prescriptions Disp Refills   • PROMETHAZINE-CODEINE 6.25-10 MG/5ML Oral Solution [Pharmacy Med Name: PROMETHAZINE W/ CODEINE SYRUP] 240 mL 0     Sig: TAKE 2.5 ML BY MOUTH EVERY 4 HOURS

## 2020-02-10 ENCOUNTER — TELEPHONE (OUTPATIENT)
Dept: FAMILY MEDICINE CLINIC | Facility: CLINIC | Age: 70
End: 2020-02-10

## 2020-02-10 RX ORDER — CEFUROXIME AXETIL 500 MG/1
500 TABLET ORAL EVERY 12 HOURS SCHEDULED
Qty: 6 TABLET | Refills: 0 | Status: SHIPPED | OUTPATIENT
Start: 2020-02-10 | End: 2020-03-13

## 2020-02-17 ENCOUNTER — TELEPHONE (OUTPATIENT)
Dept: FAMILY MEDICINE CLINIC | Facility: CLINIC | Age: 70
End: 2020-02-17

## 2020-02-17 RX ORDER — CARVEDILOL 25 MG/1
TABLET ORAL
Qty: 180 TABLET | Refills: 3 | Status: SHIPPED | OUTPATIENT
Start: 2020-02-17 | End: 2021-03-29

## 2020-02-17 NOTE — TELEPHONE ENCOUNTER
Current Outpatient Medications:   •  CARVEDILOL 25 MG Oral Tab, TAKE 1 TABLET BY MOUTH TWICE DAILY WITH FOOD, Disp: 180 tablet, Rfl: 3

## 2020-02-25 ENCOUNTER — TELEPHONE (OUTPATIENT)
Dept: PULMONOLOGY | Facility: CLINIC | Age: 70
End: 2020-02-25

## 2020-02-26 NOTE — TELEPHONE ENCOUNTER
Pt gave consent for our office to discuss her PHI w/ niece, Tracy Hatfield. She was informed of Dr. Mara Nolasco orders. Pt does not want to have surgery & would rather have radiation tx if possible.  Rescheduled pt on 2/28 12:45 pm [place] per her request. Appt info give

## 2020-02-26 NOTE — TELEPHONE ENCOUNTER
Message   Received:  Today   Message Contents   Edwina Gramajo MD  [facility] Clinical Staff             I tried to call the patient few times with no answer   I left a detailed message on her voicemail regarding her chest CT   The lung nodule slightly in

## 2020-02-28 ENCOUNTER — OFFICE VISIT (OUTPATIENT)
Dept: PULMONOLOGY | Facility: CLINIC | Age: 70
End: 2020-02-28
Payer: MEDICARE

## 2020-02-28 VITALS
SYSTOLIC BLOOD PRESSURE: 150 MMHG | HEART RATE: 71 BPM | WEIGHT: 177 LBS | RESPIRATION RATE: 18 BRPM | OXYGEN SATURATION: 90 % | BODY MASS INDEX: 32.57 KG/M2 | DIASTOLIC BLOOD PRESSURE: 81 MMHG | HEIGHT: 62 IN

## 2020-02-28 DIAGNOSIS — D49.1 LUNG TUMOR: Primary | ICD-10-CM

## 2020-02-28 DIAGNOSIS — J44.9 CHRONIC OBSTRUCTIVE PULMONARY DISEASE, UNSPECIFIED COPD TYPE (HCC): ICD-10-CM

## 2020-02-28 PROCEDURE — 99214 OFFICE O/P EST MOD 30 MIN: CPT | Performed by: INTERNAL MEDICINE

## 2020-02-28 PROCEDURE — G0463 HOSPITAL OUTPT CLINIC VISIT: HCPCS | Performed by: INTERNAL MEDICINE

## 2020-02-28 RX ORDER — ALBUTEROL SULFATE 90 UG/1
AEROSOL, METERED RESPIRATORY (INHALATION)
Qty: 8.5 G | Refills: 11 | Status: SHIPPED | OUTPATIENT
Start: 2020-02-28

## 2020-02-28 NOTE — PROGRESS NOTES
HPI:    Patient ID: Sania Plaza is a 78year old female. HPI    Review of Systems   Constitutional: Negative for fatigue and fever. HENT: Negative for congestion. Respiratory: Positive for shortness of breath.  Negative for cough, wheezing and st • DULERA 200-5 MCG/ACT Inhalation Aerosol INHALE 2 PUFFS BY MOUTH TWICE DAILY 39 g 3   • VENLAFAXINE HCL  MG Oral Capsule SR 24 Hr TAKE 1 CAPSULE BY MOUTH EVERY DAY 90 capsule 3   • PHENYTOIN SODIUM EXTENDED 100 MG Oral Cap TAKE 1 CAPSULE BY MOUTH Allergy       ANAPHYLAXIS    Comment:Other reaction(s): Anaphylaxis  Lisinopril              Coughing    Comment:Other reaction(s): Cough   PHYSICAL EXAM:   Physical Exam   Constitutional: She is oriented to person, place, and time.  She appears well-nouris    Plan :   Refer pt to radiation oncology to see Dr Chelsy Munson for further discussion regarding RT and CyberKnife   Pt very worried about biopsy and prefer not to have it   Will follow with Dr Coretta Dixon recommendations      2- severe COPD   FEV1 1.01 L 45 %

## 2020-03-01 ENCOUNTER — APPOINTMENT (OUTPATIENT)
Dept: RADIATION ONCOLOGY | Facility: HOSPITAL | Age: 70
End: 2020-03-01
Attending: RADIOLOGY
Payer: MEDICARE

## 2020-03-02 ENCOUNTER — TELEPHONE (OUTPATIENT)
Dept: PULMONOLOGY | Facility: CLINIC | Age: 70
End: 2020-03-02

## 2020-03-03 NOTE — TELEPHONE ENCOUNTER
Spoke to pt. She was given name for radiation oncologist as well as phone #. Pt voiced understanding.

## 2020-03-04 ENCOUNTER — TELEPHONE (OUTPATIENT)
Dept: INTERNAL MEDICINE CLINIC | Facility: CLINIC | Age: 70
End: 2020-03-04

## 2020-03-04 NOTE — TELEPHONE ENCOUNTER
Dr. Osorio Yeager, Dr. Guillermo Celeste next opening for an oncology consult is not until 3/17/20. He is on vacation.  Is it OK for the patient to wait until this time or would you prefer the patient to be seen by one of the other oncologist available such as REGINA Becker

## 2020-03-04 NOTE — TELEPHONE ENCOUNTER
I spoke with Dr. Marcelo Morton. He states patient does not need an urgent appt. She is currently in the hospital. OK to wait until Dr. Royal Paulino returns from vacation. Davonte notified.

## 2020-03-05 RX ORDER — TRAMADOL HYDROCHLORIDE 50 MG/1
50 TABLET ORAL EVERY 8 HOURS PRN
Qty: 150 TABLET | Refills: 1 | Status: SHIPPED | OUTPATIENT
Start: 2020-03-05 | End: 2020-08-05

## 2020-03-05 RX ORDER — ALPRAZOLAM 0.25 MG/1
TABLET ORAL
Qty: 270 TABLET | Refills: 1 | Status: SHIPPED | OUTPATIENT
Start: 2020-03-05 | End: 2020-09-08

## 2020-03-06 ENCOUNTER — TELEPHONE (OUTPATIENT)
Dept: HEMATOLOGY/ONCOLOGY | Facility: HOSPITAL | Age: 70
End: 2020-03-06

## 2020-03-06 ENCOUNTER — TELEPHONE (OUTPATIENT)
Dept: PULMONOLOGY | Facility: CLINIC | Age: 70
End: 2020-03-06

## 2020-03-06 NOTE — TELEPHONE ENCOUNTER
Left message for call back to set up Consult Med/Onc appointment with Dr Jatinder Salas after he returns to office, at her convience. She was referred by Dr Allyn Tapia to see Dr Jatinder Salas.

## 2020-03-06 NOTE — TELEPHONE ENCOUNTER
Per last office visit note: Plan :   Refer pt to radiation oncology to see Dr Greer Evans for further discussion regarding RT and CyberKnife   Pt very worried about biopsy and prefer not to have it   Will follow with Dr Mary Beth Moore recommendations       Dr.Raslan pardo

## 2020-03-06 NOTE — TELEPHONE ENCOUNTER
Pt inquiring if she should make an appt with an  oncologist(Dr. Sarthak Hand) first or Dr. Tracie Partida.  Please call [phone]

## 2020-03-12 ENCOUNTER — TELEPHONE (OUTPATIENT)
Dept: HEMATOLOGY/ONCOLOGY | Facility: HOSPITAL | Age: 70
End: 2020-03-12

## 2020-03-12 NOTE — TELEPHONE ENCOUNTER
Scooter Canales called saying she has not left the house, and is wondering if there are any precautions she should take tomorrow when she comes for her consult with Dr.Das Daniel Late.  Please call

## 2020-03-13 ENCOUNTER — OFFICE VISIT (OUTPATIENT)
Dept: RADIATION ONCOLOGY | Facility: HOSPITAL | Age: 70
End: 2020-03-13
Attending: RADIOLOGY
Payer: MEDICARE

## 2020-03-13 VITALS
HEART RATE: 65 BPM | RESPIRATION RATE: 16 BRPM | TEMPERATURE: 99 F | SYSTOLIC BLOOD PRESSURE: 143 MMHG | OXYGEN SATURATION: 94 % | WEIGHT: 179.19 LBS | DIASTOLIC BLOOD PRESSURE: 72 MMHG | BODY MASS INDEX: 33 KG/M2

## 2020-03-13 DIAGNOSIS — C34.11 MALIGNANT NEOPLASM OF UPPER LOBE OF RIGHT LUNG (HCC): Primary | ICD-10-CM

## 2020-03-13 PROCEDURE — 99212 OFFICE O/P EST SF 10 MIN: CPT

## 2020-03-13 NOTE — CONSULTS
[facility]    PATIENT'S NAME: MAY, [address]   RADIATION ONCOLOGIST: Corrina Mix.  Sherwin Middleton MD   PATIENT ACCOUNT #: [de-identified] LOCATION: [address] [place] [address] RECORD #: [id number] YOB: 1950   CONSULTATION DATE: 03/13/2020       RADI headaches, nausea or vomiting, or visual changes, and has no bony or joint pain. PAST MEDICAL HISTORY:  The patient has a past history of COPD.   She also has a history of anxiety, arthritis, status post CVA, depression, esophageal reflux, hemorrhoids, h Cranial nerves II through XII are grossly intact. There are no focal deficits. IMPRESSION:  This is a 43-year-old female with radiographic findings which are highly suggestive of malignancy.   She has had a growing PET-positive lesion in the right apex there should be any questions regarding the radiotherapy, please feel free to contact me at any time. Dictated By Kane Pardo MD  d: 03/13/2020 14:45:49  t: 03/13/2020 15:15:03  [facility] [id number]  NAD/    cc: Wesley Osler. Jose Silverman MD   [address] [place] [address]

## 2020-03-15 RX ORDER — PANTOPRAZOLE SODIUM 40 MG/1
TABLET, DELAYED RELEASE ORAL
Qty: 90 TABLET | Refills: 3 | Status: SHIPPED | OUTPATIENT
Start: 2020-03-15 | End: 2020-12-29

## 2020-03-20 ENCOUNTER — APPOINTMENT (OUTPATIENT)
Dept: RADIATION ONCOLOGY | Facility: HOSPITAL | Age: 70
End: 2020-03-20
Attending: RADIOLOGY
Payer: MEDICARE

## 2020-03-20 PROCEDURE — 77334 RADIATION TREATMENT AID(S): CPT | Performed by: RADIOLOGY

## 2020-03-20 PROCEDURE — 77470 SPECIAL RADIATION TREATMENT: CPT | Performed by: RADIOLOGY

## 2020-03-20 PROCEDURE — 77290 THER RAD SIMULAJ FIELD CPLX: CPT | Performed by: RADIOLOGY

## 2020-03-31 PROCEDURE — 77293 RESPIRATOR MOTION MGMT SIMUL: CPT | Performed by: RADIOLOGY

## 2020-03-31 PROCEDURE — 77295 3-D RADIOTHERAPY PLAN: CPT | Performed by: RADIOLOGY

## 2020-03-31 PROCEDURE — 77370 RADIATION PHYSICS CONSULT: CPT | Performed by: RADIOLOGY

## 2020-04-01 ENCOUNTER — APPOINTMENT (OUTPATIENT)
Dept: RADIATION ONCOLOGY | Facility: HOSPITAL | Age: 70
End: 2020-04-01
Attending: RADIOLOGY
Payer: MEDICARE

## 2020-04-07 ENCOUNTER — APPOINTMENT (OUTPATIENT)
Dept: RADIATION ONCOLOGY | Facility: HOSPITAL | Age: 70
End: 2020-04-07
Attending: RADIOLOGY
Payer: MEDICARE

## 2020-04-07 PROCEDURE — 77334 RADIATION TREATMENT AID(S): CPT | Performed by: RADIOLOGY

## 2020-04-07 PROCEDURE — 77280 THER RAD SIMULAJ FIELD SMPL: CPT | Performed by: RADIOLOGY

## 2020-04-07 PROCEDURE — 77300 RADIATION THERAPY DOSE PLAN: CPT | Performed by: RADIOLOGY

## 2020-04-07 PROCEDURE — 77373 STRTCTC BDY RAD THER TX DLVR: CPT | Performed by: RADIOLOGY

## 2020-04-07 RX ORDER — VENLAFAXINE HYDROCHLORIDE 150 MG/1
CAPSULE, EXTENDED RELEASE ORAL
Qty: 90 CAPSULE | Refills: 3 | Status: SHIPPED | OUTPATIENT
Start: 2020-04-07 | End: 2021-06-01

## 2020-04-09 ENCOUNTER — OFFICE VISIT (OUTPATIENT)
Dept: RADIATION ONCOLOGY | Facility: HOSPITAL | Age: 70
End: 2020-04-09
Attending: RADIOLOGY
Payer: MEDICARE

## 2020-04-09 VITALS
HEART RATE: 65 BPM | DIASTOLIC BLOOD PRESSURE: 63 MMHG | SYSTOLIC BLOOD PRESSURE: 141 MMHG | TEMPERATURE: 98 F | RESPIRATION RATE: 16 BRPM | OXYGEN SATURATION: 99 %

## 2020-04-09 DIAGNOSIS — C34.90 LUNG CANCER (HCC): Primary | ICD-10-CM

## 2020-04-09 PROCEDURE — 77373 STRTCTC BDY RAD THER TX DLVR: CPT | Performed by: RADIOLOGY

## 2020-04-09 NOTE — PROGRESS NOTES
[facility] Radiation Treatment Management Note 1-5    Patient:  Марина Carranza  Age:  78year old  Visit Diagnosis:    1.  Lung cancer [facility])      Primary Rad/Onc:  Dr. Tyree Romo    Site Delivered Dose (Gy) Prescribed Dose (Gy) Fr

## 2020-04-09 NOTE — PATIENT INSTRUCTIONS
Follow up with Dr. Sada Grissom in 3 months ( July 2020). Call [phone] to schedule a follow up appointment. Schedule your CT scan in 3 months (July 2020). Follow up with Dr. Anny Haynes 2-3 months.

## 2020-04-13 ENCOUNTER — TELEPHONE (OUTPATIENT)
Dept: INTERNAL MEDICINE CLINIC | Facility: CLINIC | Age: 70
End: 2020-04-13

## 2020-04-13 NOTE — TELEPHONE ENCOUNTER
Bre Alcocer wants you to know that Glenn Day stopped the Tramadol 2-3 weeks ago because she thinks it was giving her seizures, so she is back to taking all the Advil. Bre Alcocer states she was so much better on it.

## 2020-04-14 ENCOUNTER — APPOINTMENT (OUTPATIENT)
Dept: RADIATION ONCOLOGY | Facility: HOSPITAL | Age: 70
End: 2020-04-14
Attending: RADIOLOGY
Payer: MEDICARE

## 2020-04-14 PROCEDURE — 77373 STRTCTC BDY RAD THER TX DLVR: CPT | Performed by: RADIOLOGY

## 2020-04-14 NOTE — TELEPHONE ENCOUNTER
Tramadol can possibly lower the seizure threshold . Hard to know if her Dilantin level is therapeutic, she usually runs on the low side.

## 2020-04-16 ENCOUNTER — APPOINTMENT (OUTPATIENT)
Dept: RADIATION ONCOLOGY | Facility: HOSPITAL | Age: 70
End: 2020-04-16
Attending: RADIOLOGY
Payer: MEDICARE

## 2020-04-16 PROCEDURE — 77373 STRTCTC BDY RAD THER TX DLVR: CPT | Performed by: RADIOLOGY

## 2020-04-20 ENCOUNTER — OFFICE VISIT (OUTPATIENT)
Dept: RADIATION ONCOLOGY | Facility: HOSPITAL | Age: 70
End: 2020-04-20
Attending: RADIOLOGY
Payer: MEDICARE

## 2020-04-20 PROCEDURE — 77373 STRTCTC BDY RAD THER TX DLVR: CPT | Performed by: RADIOLOGY

## 2020-04-20 RX ORDER — PHENYTOIN SODIUM 100 MG/1
CAPSULE, EXTENDED RELEASE ORAL
Qty: 270 CAPSULE | Refills: 3 | Status: SHIPPED | OUTPATIENT
Start: 2020-04-20 | End: 2021-03-29

## 2020-04-21 ENCOUNTER — TELEPHONE (OUTPATIENT)
Dept: FAMILY MEDICINE CLINIC | Facility: CLINIC | Age: 70
End: 2020-04-21

## 2020-04-21 ENCOUNTER — TELEPHONE (OUTPATIENT)
Dept: INTERNAL MEDICINE CLINIC | Facility: CLINIC | Age: 70
End: 2020-04-21

## 2020-04-21 PROCEDURE — 77336 RADIATION PHYSICS CONSULT: CPT | Performed by: RADIOLOGY

## 2020-04-21 NOTE — TELEPHONE ENCOUNTER
Patient states that her leg gives out when she gets up in the morning, and also feels like passing out, she is concerned that the radiation treatments she has been having ordered by Dr Romana Pellant may possibly be the cause ???  Would like to talk to you.

## 2020-04-21 NOTE — TELEPHONE ENCOUNTER
Patient has left leg weakness at times, she has low back issues, had a previous stroke which left her with minimal left sided weakness, she is on dilantin for seizures. She had a PET CT in Sept , no brain tumors.    She has just finished treatment with a

## 2020-04-24 NOTE — PROGRESS NOTES
[facility]    PATIENT'S NAME: MAY, [address]   RADIATION ONCOLOGIST: Isha Herman.  Marlene Reeves MD   PATIENT ACCOUNT #: [de-identified] LOCATION: [address] [place] [address] RECORD #: [id number] YOB: 1950   DATE: 04/20/2020       RADIATION ONCOLOG lung malignancy. Although never biopsy-proven, this seemed extremely likely based upon all the radiographic testing. The patient was a nonsurgical candidate and was uninterested in proceeding to biopsy.     TOLERANCE:  The patient tolerated the treatment

## 2020-04-30 ENCOUNTER — TELEPHONE (OUTPATIENT)
Dept: INTERNAL MEDICINE CLINIC | Facility: CLINIC | Age: 70
End: 2020-04-30

## 2020-04-30 PROBLEM — R29.898 LEFT LEG WEAKNESS: Status: ACTIVE | Noted: 2020-04-30

## 2020-04-30 PROBLEM — C34.91: Status: ACTIVE | Noted: 2020-04-30

## 2020-04-30 NOTE — TELEPHONE ENCOUNTER
Patient is calling per your request, is still having the same symptoms, leg weakness etc. She will be going to [facility] on 5/5/20 with Nica Mujica, if you would enter the orders for blood and brain scan as discussed.

## 2020-05-05 ENCOUNTER — LAB ENCOUNTER (OUTPATIENT)
Dept: LAB | Facility: HOSPITAL | Age: 70
End: 2020-05-05
Attending: INTERNAL MEDICINE
Payer: MEDICARE

## 2020-05-05 ENCOUNTER — HOSPITAL ENCOUNTER (OUTPATIENT)
Dept: CT IMAGING | Facility: HOSPITAL | Age: 70
Discharge: HOME | End: 2020-05-05
Attending: INTERNAL MEDICINE
Payer: MEDICARE

## 2020-05-05 DIAGNOSIS — I10 ESSENTIAL HYPERTENSION, BENIGN: ICD-10-CM

## 2020-05-05 DIAGNOSIS — R56.9 SEIZURE (HCC): ICD-10-CM

## 2020-05-05 DIAGNOSIS — E78.00 PURE HYPERCHOLESTEROLEMIA: ICD-10-CM

## 2020-05-05 DIAGNOSIS — C34.91: ICD-10-CM

## 2020-05-05 DIAGNOSIS — R29.898 LEFT LEG WEAKNESS: ICD-10-CM

## 2020-05-05 PROCEDURE — 80061 LIPID PANEL: CPT

## 2020-05-05 PROCEDURE — 84439 ASSAY OF FREE THYROXINE: CPT

## 2020-05-05 PROCEDURE — 85025 COMPLETE CBC W/AUTO DIFF WBC: CPT

## 2020-05-05 PROCEDURE — 84443 ASSAY THYROID STIM HORMONE: CPT

## 2020-05-05 PROCEDURE — 80185 ASSAY OF PHENYTOIN TOTAL: CPT

## 2020-05-05 PROCEDURE — 80053 COMPREHEN METABOLIC PANEL: CPT

## 2020-05-05 PROCEDURE — 36415 COLL VENOUS BLD VENIPUNCTURE: CPT | Performed by: INTERNAL MEDICINE

## 2020-05-05 PROCEDURE — 70450 CT HEAD/BRAIN W/O DYE: CPT | Performed by: INTERNAL MEDICINE

## 2020-05-05 PROCEDURE — 83036 HEMOGLOBIN GLYCOSYLATED A1C: CPT | Performed by: INTERNAL MEDICINE

## 2020-05-11 ENCOUNTER — TELEPHONE (OUTPATIENT)
Dept: FAMILY MEDICINE CLINIC | Facility: CLINIC | Age: 70
End: 2020-05-11

## 2020-05-11 DIAGNOSIS — M54.42 ACUTE MIDLINE LOW BACK PAIN WITH LEFT-SIDED SCIATICA: Primary | ICD-10-CM

## 2020-05-11 NOTE — TELEPHONE ENCOUNTER
Patient was given blood test results and CT Brain results, understands. She is still experiencing the same symptoms. She thinks its probably her back and or nerve problem. What is the next step? Please advise.

## 2020-05-11 NOTE — TELEPHONE ENCOUNTER
Next step would be to image the back, ls spine xray and then MRI , if MRI of the LS spine is not possible then CT LS spine.

## 2020-05-15 RX ORDER — CLOPIDOGREL BISULFATE 75 MG/1
TABLET ORAL
Qty: 90 TABLET | Refills: 3 | Status: SHIPPED | OUTPATIENT
Start: 2020-05-15 | End: 2020-11-30

## 2020-05-15 RX ORDER — MOMETASONE FUROATE AND FORMOTEROL FUMARATE DIHYDRATE 200; 5 UG/1; UG/1
AEROSOL RESPIRATORY (INHALATION)
Qty: 39 G | Refills: 3 | Status: SHIPPED | OUTPATIENT
Start: 2020-05-15 | End: 2021-04-30

## 2020-05-27 ENCOUNTER — HOSPITAL ENCOUNTER (OUTPATIENT)
Dept: GENERAL RADIOLOGY | Facility: HOSPITAL | Age: 70
Discharge: HOME | End: 2020-05-27
Attending: INTERNAL MEDICINE
Payer: MEDICARE

## 2020-05-27 DIAGNOSIS — M54.42 ACUTE MIDLINE LOW BACK PAIN WITH LEFT-SIDED SCIATICA: ICD-10-CM

## 2020-05-27 PROCEDURE — 72110 X-RAY EXAM L-2 SPINE 4/>VWS: CPT | Performed by: INTERNAL MEDICINE

## 2020-06-24 ENCOUNTER — HOSPITAL ENCOUNTER (OUTPATIENT)
Dept: MRI IMAGING | Facility: HOSPITAL | Age: 70
Discharge: HOME | End: 2020-06-24
Attending: INTERNAL MEDICINE
Payer: MEDICARE

## 2020-06-24 DIAGNOSIS — M54.42 ACUTE LEFT-SIDED LOW BACK PAIN WITH LEFT-SIDED SCIATICA: ICD-10-CM

## 2020-06-24 PROCEDURE — 72148 MRI LUMBAR SPINE W/O DYE: CPT | Performed by: INTERNAL MEDICINE

## 2020-07-09 RX ORDER — DOXEPIN HYDROCHLORIDE 10 MG/1
CAPSULE ORAL
Qty: 90 CAPSULE | Refills: 1 | Status: SHIPPED | OUTPATIENT
Start: 2020-07-09 | End: 2022-01-06

## 2020-07-15 RX ORDER — IRBESARTAN 300 MG/1
TABLET ORAL
Qty: 90 TABLET | Refills: 3 | Status: SHIPPED | OUTPATIENT
Start: 2020-07-15 | End: 2021-06-28

## 2020-07-16 ENCOUNTER — HOSPITAL ENCOUNTER (OUTPATIENT)
Dept: CT IMAGING | Facility: HOSPITAL | Age: 70
Discharge: HOME | End: 2020-07-16
Attending: RADIOLOGY
Payer: MEDICARE

## 2020-07-16 DIAGNOSIS — C34.90 LUNG CANCER (HCC): ICD-10-CM

## 2020-07-16 PROCEDURE — 71250 CT THORAX DX C-: CPT | Performed by: RADIOLOGY

## 2020-07-20 ENCOUNTER — TELEPHONE (OUTPATIENT)
Dept: PULMONOLOGY | Facility: CLINIC | Age: 70
End: 2020-07-20

## 2020-07-20 NOTE — TELEPHONE ENCOUNTER
I tried to call the patient several times with no answer  Please inform the patient that her chest CT now is better  This is really good news  The lung nodule improved and reduced in  size ,  indicating benign finding  No further order for now  Patient to

## 2020-07-20 NOTE — TELEPHONE ENCOUNTER
Spoke with patient. Patient informed of Dr. Yoko Farah result note/message below. Patient verbalized understanding, states she will call pulmo office back to schedule follow-up appointment.

## 2020-07-28 ENCOUNTER — VIRTUAL PHONE E/M (OUTPATIENT)
Dept: RADIATION ONCOLOGY | Facility: HOSPITAL | Age: 70
End: 2020-07-28
Attending: RADIOLOGY
Payer: MEDICARE

## 2020-07-28 DIAGNOSIS — C34.91: ICD-10-CM

## 2020-07-28 NOTE — PROGRESS NOTES
Virtual Telephone Check-In    Yaz Macias verbally consents to a Virtual/Telephone Check-In visit on 07/28/20. Patient has been referred to the [facility] website at [email or web address] to review the yearly Consent to Treat document.     Patient Gaby Knott

## 2020-07-29 NOTE — PROGRESS NOTES
[facility]    PATIENT'S NAME: MAY, [address]   RADIATION ONCOLOGIST: Corrina Mix.  Sherwin Middleton MD   PATIENT ACCOUNT #: [de-identified] LOCATION: [address] [place] [address] RECORD #: [id number] YOB: 1950   FOLLOW-UP DATE: 07/28/2020       RADIATI was considered a nonsurgical candidate. They repeated her imaging in 3 months and this showed an interval increase in conspicuity of the spiculated nodule within the right apex with no evidence of any progressive hilar or mediastinal disease.   She is, the have any problems or questions prior to that time, she should feel free to contact my office and I would be happy to see her sooner. Otherwise, she should continue to follow with Dr. Marcy Johansen, who is managing her COPD and other pulmonary concerns.     Thank

## 2020-07-31 ENCOUNTER — OFFICE VISIT (OUTPATIENT)
Dept: INTERNAL MEDICINE CLINIC | Facility: CLINIC | Age: 70
End: 2020-07-31
Payer: MEDICARE

## 2020-07-31 VITALS
SYSTOLIC BLOOD PRESSURE: 140 MMHG | HEIGHT: 62 IN | WEIGHT: 171 LBS | TEMPERATURE: 97 F | HEART RATE: 72 BPM | BODY MASS INDEX: 31.47 KG/M2 | DIASTOLIC BLOOD PRESSURE: 90 MMHG

## 2020-07-31 DIAGNOSIS — I10 ESSENTIAL HYPERTENSION, BENIGN: Primary | ICD-10-CM

## 2020-07-31 DIAGNOSIS — R73.9 HYPERGLYCEMIA: ICD-10-CM

## 2020-07-31 DIAGNOSIS — E78.00 PURE HYPERCHOLESTEROLEMIA: ICD-10-CM

## 2020-07-31 DIAGNOSIS — R56.9 SEIZURE (HCC): ICD-10-CM

## 2020-07-31 PROCEDURE — 99214 OFFICE O/P EST MOD 30 MIN: CPT | Performed by: INTERNAL MEDICINE

## 2020-07-31 PROCEDURE — 90732 PPSV23 VACC 2 YRS+ SUBQ/IM: CPT | Performed by: INTERNAL MEDICINE

## 2020-07-31 PROCEDURE — G0009 ADMIN PNEUMOCOCCAL VACCINE: HCPCS | Performed by: INTERNAL MEDICINE

## 2020-07-31 NOTE — PROGRESS NOTES
HPI:    Patient ID: Марина Carranza is a 69year old female. HPIpatient is doing well, Chest Ct shows improvement, repeat in Jan.   Mood has been good, oxygen dependence is stable. No swelling. HPI  Hypertension   This is a chronic problem.  The current Neurological: Negative for dizziness, syncope, facial asymmetry, weakness, light-headedness, numbness and headaches.             Current Outpatient Medications   Medication Sig Dispense Refill   • IRBESARTAN 300 MG Oral Tab TAKE 1 TABLET(300 MG) BY MOUTH lb (77.6 kg)   BMI 31.28 kg/m²      Physical Exam   Constitutional: She is oriented to person, place, and time. She appears well-developed. HENT:   Mouth/Throat: Oropharynx is clear and moist.   Eyes: Pupils are equal, round, and reactive to light.  EOM a

## 2020-08-04 ENCOUNTER — TELEPHONE (OUTPATIENT)
Dept: FAMILY MEDICINE CLINIC | Facility: CLINIC | Age: 70
End: 2020-08-04

## 2020-08-04 NOTE — TELEPHONE ENCOUNTER
Refill--TRAMADOL---wants directions to 1 every 4 hours----she called yesterday, forgot to enter.      Pita Oleary

## 2020-08-05 RX ORDER — ATORVASTATIN CALCIUM 40 MG/1
TABLET, FILM COATED ORAL
Qty: 90 TABLET | Refills: 3 | Status: SHIPPED | OUTPATIENT
Start: 2020-08-05 | End: 2021-09-23

## 2020-08-05 RX ORDER — TRAMADOL HYDROCHLORIDE 50 MG/1
50 TABLET ORAL EVERY 6 HOURS PRN
Qty: 360 TABLET | Refills: 1 | Status: SHIPPED | OUTPATIENT
Start: 2020-08-05 | End: 2021-02-01

## 2020-08-21 RX ORDER — HYDROXYZINE HYDROCHLORIDE 25 MG/1
TABLET, FILM COATED ORAL
Qty: 270 TABLET | Refills: 3 | Status: SHIPPED | OUTPATIENT
Start: 2020-08-21 | End: 2022-01-06

## 2020-09-08 RX ORDER — ALPRAZOLAM 0.25 MG/1
TABLET ORAL
Qty: 270 TABLET | Refills: 1 | Status: SHIPPED | OUTPATIENT
Start: 2020-09-08 | End: 2021-03-29

## 2020-09-11 ENCOUNTER — TELEPHONE (OUTPATIENT)
Dept: INTERNAL MEDICINE CLINIC | Facility: CLINIC | Age: 70
End: 2020-09-11

## 2020-09-24 ENCOUNTER — TELEPHONE (OUTPATIENT)
Dept: INTERNAL MEDICINE CLINIC | Facility: CLINIC | Age: 70
End: 2020-09-24

## 2020-09-24 RX ORDER — HYDROCHLOROTHIAZIDE 12.5 MG/1
12.5 CAPSULE, GELATIN COATED ORAL DAILY
Qty: 90 CAPSULE | Refills: 3 | Status: SHIPPED | OUTPATIENT
Start: 2020-09-24 | End: 2021-09-23

## 2020-10-14 ENCOUNTER — TELEPHONE (OUTPATIENT)
Dept: INTERNAL MEDICINE CLINIC | Facility: CLINIC | Age: 70
End: 2020-10-14

## 2020-10-14 NOTE — TELEPHONE ENCOUNTER
Therese needs a refill of:    ATORVASTATIN 40 MG Oral Tab TAKE 1 TABLET(40 MG) BY MOUTH EVERY NIGHT 90 tablet 3

## 2020-10-15 NOTE — TELEPHONE ENCOUNTER
This refill is inappropriate. The patient gets all her medications at [facility] in [place] and she currently has refills on Atorvastatin.

## 2020-10-22 ENCOUNTER — IMMUNIZATION (OUTPATIENT)
Dept: FAMILY MEDICINE CLINIC | Facility: CLINIC | Age: 70
End: 2020-10-22
Payer: MEDICARE

## 2020-10-22 ENCOUNTER — TELEPHONE (OUTPATIENT)
Dept: INTERNAL MEDICINE CLINIC | Facility: CLINIC | Age: 70
End: 2020-10-22

## 2020-10-22 DIAGNOSIS — Z23 NEED FOR VACCINATION: ICD-10-CM

## 2020-10-22 PROCEDURE — 90662 IIV NO PRSV INCREASED AG IM: CPT | Performed by: FAMILY MEDICINE

## 2020-10-22 PROCEDURE — G0008 ADMIN INFLUENZA VIRUS VAC: HCPCS | Performed by: FAMILY MEDICINE

## 2020-11-30 RX ORDER — CLOPIDOGREL BISULFATE 75 MG/1
TABLET ORAL
Qty: 90 TABLET | Refills: 3 | Status: SHIPPED | OUTPATIENT
Start: 2020-11-30 | End: 2021-12-22

## 2020-12-14 ENCOUNTER — TELEPHONE (OUTPATIENT)
Dept: FAMILY MEDICINE CLINIC | Facility: CLINIC | Age: 70
End: 2020-12-14

## 2020-12-14 DIAGNOSIS — Z12.31 VISIT FOR SCREENING MAMMOGRAM: Primary | ICD-10-CM

## 2020-12-17 ENCOUNTER — TELEPHONE (OUTPATIENT)
Dept: INTERNAL MEDICINE CLINIC | Facility: CLINIC | Age: 70
End: 2020-12-17

## 2020-12-17 RX ORDER — DOCUSATE SODIUM 100 MG/1
100 CAPSULE, LIQUID FILLED ORAL 2 TIMES DAILY PRN
Qty: 60 CAPSULE | Refills: 11 | Status: SHIPPED | OUTPATIENT
Start: 2020-12-17 | End: 2021-12-06

## 2020-12-29 RX ORDER — PANTOPRAZOLE SODIUM 40 MG/1
TABLET, DELAYED RELEASE ORAL
Qty: 90 TABLET | Refills: 3 | Status: SHIPPED | OUTPATIENT
Start: 2020-12-29 | End: 2021-10-28

## 2021-01-01 ENCOUNTER — TELEPHONE (OUTPATIENT)
Dept: HEMATOLOGY/ONCOLOGY | Facility: HOSPITAL | Age: 71
End: 2021-01-01

## 2021-01-01 ENCOUNTER — TELEPHONE (OUTPATIENT)
Dept: RADIATION ONCOLOGY | Facility: HOSPITAL | Age: 71
End: 2021-01-01

## 2021-01-01 RX ORDER — CEFUROXIME AXETIL 500 MG/1
TABLET ORAL
Qty: 6 TABLET | Refills: 0 | OUTPATIENT
Start: 2021-01-01

## 2021-01-01 NOTE — TELEPHONE ENCOUNTER
Having a Colonoscopy next Thursday, wants to know when she should stop her Plavix ? ? Please call her at home. Sharla Mckeon Statement Selected

## 2021-01-12 ENCOUNTER — HOSPITAL ENCOUNTER (OUTPATIENT)
Dept: MAMMOGRAPHY | Age: 71
Discharge: HOME | End: 2021-01-12
Attending: INTERNAL MEDICINE
Payer: MEDICARE

## 2021-01-12 DIAGNOSIS — Z12.31 VISIT FOR SCREENING MAMMOGRAM: ICD-10-CM

## 2021-01-12 PROCEDURE — 77063 BREAST TOMOSYNTHESIS BI: CPT | Performed by: INTERNAL MEDICINE

## 2021-01-12 PROCEDURE — 77067 SCR MAMMO BI INCL CAD: CPT | Performed by: INTERNAL MEDICINE

## 2021-01-19 ENCOUNTER — LAB ENCOUNTER (OUTPATIENT)
Dept: LAB | Age: 71
End: 2021-01-19
Attending: INTERNAL MEDICINE
Payer: MEDICARE

## 2021-01-19 ENCOUNTER — OFFICE VISIT (OUTPATIENT)
Dept: INTERNAL MEDICINE CLINIC | Facility: CLINIC | Age: 71
End: 2021-01-19
Payer: MEDICARE

## 2021-01-19 VITALS
BODY MASS INDEX: 31.47 KG/M2 | HEART RATE: 68 BPM | DIASTOLIC BLOOD PRESSURE: 80 MMHG | WEIGHT: 171 LBS | SYSTOLIC BLOOD PRESSURE: 130 MMHG | HEIGHT: 62 IN | TEMPERATURE: 97 F

## 2021-01-19 DIAGNOSIS — R73.9 HYPERGLYCEMIA: ICD-10-CM

## 2021-01-19 DIAGNOSIS — R56.9 SEIZURE (HCC): ICD-10-CM

## 2021-01-19 DIAGNOSIS — E78.00 PURE HYPERCHOLESTEROLEMIA: ICD-10-CM

## 2021-01-19 DIAGNOSIS — I10 ESSENTIAL HYPERTENSION, BENIGN: ICD-10-CM

## 2021-01-19 DIAGNOSIS — I10 ESSENTIAL HYPERTENSION, BENIGN: Primary | ICD-10-CM

## 2021-01-19 DIAGNOSIS — J43.1 PANLOBULAR EMPHYSEMA (HCC): ICD-10-CM

## 2021-01-19 LAB
ALBUMIN SERPL-MCNC: 3.5 G/DL (ref 3.4–5)
ALBUMIN/GLOB SERPL: 0.9 {RATIO} (ref 1–2)
ALP LIVER SERPL-CCNC: 151 U/L
ALT SERPL-CCNC: 29 U/L
ANION GAP SERPL CALC-SCNC: 7 MMOL/L (ref 0–18)
AST SERPL-CCNC: 25 U/L (ref 15–37)
BASOPHILS # BLD AUTO: 0.03 X10(3) UL (ref 0–0.2)
BASOPHILS NFR BLD AUTO: 0.6 %
BILIRUB SERPL-MCNC: 0.3 MG/DL (ref 0.1–2)
BUN BLD-MCNC: 16 MG/DL (ref 7–18)
BUN/CREAT SERPL: 25.4 (ref 10–20)
CALCIUM BLD-MCNC: 9.4 MG/DL (ref 8.5–10.1)
CHLORIDE SERPL-SCNC: 104 MMOL/L (ref 98–112)
CHOLEST SMN-MCNC: 213 MG/DL (ref ?–200)
CO2 SERPL-SCNC: 25 MMOL/L (ref 21–32)
CREAT BLD-MCNC: 0.63 MG/DL
DEPRECATED RDW RBC AUTO: 45.7 FL (ref 35.1–46.3)
EOSINOPHIL # BLD AUTO: 0.17 X10(3) UL (ref 0–0.7)
EOSINOPHIL NFR BLD AUTO: 3.3 %
ERYTHROCYTE [DISTWIDTH] IN BLOOD BY AUTOMATED COUNT: 13.2 % (ref 11–15)
EST. AVERAGE GLUCOSE BLD GHB EST-MCNC: 85 MG/DL (ref 68–126)
GLOBULIN PLAS-MCNC: 4 G/DL (ref 2.8–4.4)
GLUCOSE BLD-MCNC: 98 MG/DL (ref 70–99)
HBA1C MFR BLD HPLC: 4.6 % (ref ?–5.7)
HCT VFR BLD AUTO: 36.3 %
HDLC SERPL-MCNC: 78 MG/DL (ref 40–59)
HGB BLD-MCNC: 12.2 G/DL
IMM GRANULOCYTES # BLD AUTO: 0.03 X10(3) UL (ref 0–1)
IMM GRANULOCYTES NFR BLD: 0.6 %
LDLC SERPL CALC-MCNC: 119 MG/DL (ref ?–100)
LYMPHOCYTES # BLD AUTO: 0.6 X10(3) UL (ref 1–4)
LYMPHOCYTES NFR BLD AUTO: 11.7 %
M PROTEIN MFR SERPL ELPH: 7.5 G/DL (ref 6.4–8.2)
MCH RBC QN AUTO: 32.2 PG (ref 26–34)
MCHC RBC AUTO-ENTMCNC: 33.6 G/DL (ref 31–37)
MCV RBC AUTO: 95.8 FL
MONOCYTES # BLD AUTO: 0.55 X10(3) UL (ref 0.1–1)
MONOCYTES NFR BLD AUTO: 10.7 %
NEUTROPHILS # BLD AUTO: 3.75 X10 (3) UL (ref 1.5–7.7)
NEUTROPHILS # BLD AUTO: 3.75 X10(3) UL (ref 1.5–7.7)
NEUTROPHILS NFR BLD AUTO: 73.1 %
NONHDLC SERPL-MCNC: 135 MG/DL (ref ?–130)
OSMOLALITY SERPL CALC.SUM OF ELEC: 283 MOSM/KG (ref 275–295)
PATIENT FASTING Y/N/NP: YES
PATIENT FASTING Y/N/NP: YES
PHENYTOIN SERPL-MCNC: 10.2 UG/ML (ref 10–20)
PLATELET # BLD AUTO: 231 10(3)UL (ref 150–450)
POTASSIUM SERPL-SCNC: 3.9 MMOL/L (ref 3.5–5.1)
RBC # BLD AUTO: 3.79 X10(6)UL
SODIUM SERPL-SCNC: 136 MMOL/L (ref 136–145)
T4 FREE SERPL-MCNC: 0.9 NG/DL (ref 0.8–1.7)
TRIGL SERPL-MCNC: 78 MG/DL (ref 30–149)
TSI SER-ACNC: 2.21 MIU/ML (ref 0.36–3.74)
VLDLC SERPL CALC-MCNC: 16 MG/DL (ref 0–30)
WBC # BLD AUTO: 5.1 X10(3) UL (ref 4–11)

## 2021-01-19 PROCEDURE — 84443 ASSAY THYROID STIM HORMONE: CPT

## 2021-01-19 PROCEDURE — 80061 LIPID PANEL: CPT

## 2021-01-19 PROCEDURE — 84439 ASSAY OF FREE THYROXINE: CPT

## 2021-01-19 PROCEDURE — 36415 COLL VENOUS BLD VENIPUNCTURE: CPT

## 2021-01-19 PROCEDURE — 85025 COMPLETE CBC W/AUTO DIFF WBC: CPT

## 2021-01-19 PROCEDURE — 80053 COMPREHEN METABOLIC PANEL: CPT

## 2021-01-19 PROCEDURE — 99214 OFFICE O/P EST MOD 30 MIN: CPT | Performed by: INTERNAL MEDICINE

## 2021-01-19 PROCEDURE — 83036 HEMOGLOBIN GLYCOSYLATED A1C: CPT | Performed by: INTERNAL MEDICINE

## 2021-01-19 PROCEDURE — 80185 ASSAY OF PHENYTOIN TOTAL: CPT

## 2021-01-19 NOTE — PROGRESS NOTES
HPI:    Patient ID: Pam Galdamez is a 69year old female. HPIHPI  Hypertension   This is a chronic problem. The current episode started more than 1 year ago. The problem is unchanged. The problem is controlled.  Pertinent negatives include no anxiety, state.   Neurological: Negative for dizziness, syncope, facial asymmetry, weakness, light-headedness, numbness and headaches.             Current Outpatient Medications   Medication Sig Dispense Refill   • PANTOPRAZOLE SODIUM 40 MG Oral Tab EC TAKE 1 TABLET Reported on 1/19/2021) 90 capsule 1     Allergies:  Shellfish Allergy       ANAPHYLAXIS    Comment:Other reaction(s):  Anaphylaxis  Lisinopril              Coughing    Comment:Other reaction(s): Cough   PHYSICAL EXAM:   /80   Pulse 68   Temp 97.1 °F ( Phenytoin (Dilantin) Total [E]      Meds This Visit:  Requested Prescriptions      No prescriptions requested or ordered in this encounter       Imaging & Referrals:  None       #[id number]

## 2021-01-27 DIAGNOSIS — Z23 NEED FOR VACCINATION: ICD-10-CM

## 2021-01-28 ENCOUNTER — HOSPITAL ENCOUNTER (OUTPATIENT)
Dept: CT IMAGING | Facility: HOSPITAL | Age: 71
Discharge: HOME | End: 2021-01-28
Attending: RADIOLOGY
Payer: MEDICARE

## 2021-01-28 DIAGNOSIS — C34.91: ICD-10-CM

## 2021-01-28 PROCEDURE — 71250 CT THORAX DX C-: CPT | Performed by: RADIOLOGY

## 2021-02-01 RX ORDER — TRAMADOL HYDROCHLORIDE 50 MG/1
TABLET ORAL
Qty: 360 TABLET | Refills: 0 | Status: SHIPPED | OUTPATIENT
Start: 2021-02-01 | End: 2021-04-30

## 2021-02-02 ENCOUNTER — OFFICE VISIT (OUTPATIENT)
Dept: RADIATION ONCOLOGY | Facility: HOSPITAL | Age: 71
End: 2021-02-02
Attending: RADIOLOGY
Payer: MEDICARE

## 2021-02-02 VITALS
DIASTOLIC BLOOD PRESSURE: 64 MMHG | SYSTOLIC BLOOD PRESSURE: 111 MMHG | TEMPERATURE: 95 F | OXYGEN SATURATION: 95 % | RESPIRATION RATE: 16 BRPM | HEART RATE: 71 BPM

## 2021-02-02 DIAGNOSIS — C34.11 MALIGNANT NEOPLASM OF UPPER LOBE OF RIGHT LUNG (HCC): Primary | ICD-10-CM

## 2021-02-02 PROCEDURE — 99211 OFF/OP EST MAY X REQ PHY/QHP: CPT

## 2021-02-02 NOTE — PATIENT INSTRUCTIONS
Follow up with Dr. Courtney Sanabira in 6 months August 2021. Call [phone] to schedule a follow up appointment. Schedule your CT scan prior to your follow up due in August 2021. Call [phone] to schedule.    For any issues related to radiation call [phone]

## 2021-02-02 NOTE — PROGRESS NOTES
[facility]    PATIENT'S NAME: MAY, [address]   RADIATION ONCOLOGIST: Danielle Stevens.  Sada Grissom MD   PATIENT ACCOUNT #: [de-identified] LOCATION: [address] [place] [address] RECORD #: [id number] YOB: 1950   FOLLOW-UP DATE: 02/02/2021       RADIATI has no side effects as a result of her radiation. She has had no change in her breathing. She continues to have stable shortness of breath which is unchanged for quite some time. This primarily manifests as dyspnea on exertion.   She has no change in her Dr. Yaquelin Canales who is managing her respiratory issues. I told her to contact Dr. Jesús Seaman for followup if her respiratory circumstances should change or if Dr. Yaquelin Canales felt it was appropriate.     Thank you very much for allowing me the opportunity to partici

## 2021-02-02 NOTE — PROGRESS NOTES
Pt came in for follow up accompanied by her niece. Medical history reviewed. Medications reviewed. VSS. Denies any pain at this time,  States she has intermittent back pain. Eating and drinking well. Denies productive cough. SOB with exertion.   Pt d

## 2021-02-04 ENCOUNTER — IMMUNIZATION (OUTPATIENT)
Dept: LAB | Facility: HOSPITAL | Age: 71
End: 2021-02-04
Attending: HOSPITALIST
Payer: MEDICARE

## 2021-02-04 DIAGNOSIS — Z23 NEED FOR VACCINATION: Primary | ICD-10-CM

## 2021-02-04 PROCEDURE — 0011A SARSCOV2 VAC 100MCG/0.5ML IM: CPT

## 2021-03-04 ENCOUNTER — IMMUNIZATION (OUTPATIENT)
Dept: LAB | Facility: HOSPITAL | Age: 71
End: 2021-03-04
Attending: EMERGENCY MEDICINE
Payer: MEDICARE

## 2021-03-04 DIAGNOSIS — Z23 NEED FOR VACCINATION: Primary | ICD-10-CM

## 2021-03-04 PROCEDURE — 0012A SARSCOV2 VAC 100MCG/0.5ML IM: CPT

## 2021-03-29 ENCOUNTER — TELEPHONE (OUTPATIENT)
Dept: INTERNAL MEDICINE CLINIC | Facility: CLINIC | Age: 71
End: 2021-03-29

## 2021-03-29 RX ORDER — PHENYTOIN SODIUM 100 MG/1
100 CAPSULE, EXTENDED RELEASE ORAL 3 TIMES DAILY
Qty: 270 CAPSULE | Refills: 3 | Status: SHIPPED | OUTPATIENT
Start: 2021-03-29

## 2021-03-29 RX ORDER — CARVEDILOL 25 MG/1
TABLET ORAL
Qty: 180 TABLET | Refills: 3 | Status: SHIPPED | OUTPATIENT
Start: 2021-03-29

## 2021-03-29 RX ORDER — ALPRAZOLAM 0.25 MG/1
TABLET ORAL
Qty: 270 TABLET | Refills: 1 | Status: SHIPPED | OUTPATIENT
Start: 2021-03-29 | End: 2021-10-13

## 2021-03-29 NOTE — TELEPHONE ENCOUNTER
Therese needs a refill of:    • PHENYTOIN SODIUM EXTENDED 100 MG Oral Cap TAKE 1 CAPSULE BY MOUTH THREE TIMES DAILY 270 capsule 3

## 2021-03-30 ENCOUNTER — TELEPHONE (OUTPATIENT)
Dept: INTERNAL MEDICINE CLINIC | Facility: CLINIC | Age: 71
End: 2021-03-30

## 2021-03-30 NOTE — TELEPHONE ENCOUNTER
Per Ellis, Alprazolam 0.25mg tablets is not covered under patient's insurance. Please call plan at [phone] to initiate a prior authorization or call/fax pharmacy to change medication. Patient ID # is [id number].

## 2021-04-01 NOTE — TELEPHONE ENCOUNTER
Prior authorization for alprazolam has been initiated through [employer] using keycode: [id number] It takes about 1-5 business days for a decision to come back.

## 2021-04-02 ENCOUNTER — TELEPHONE (OUTPATIENT)
Dept: INTERNAL MEDICINE CLINIC | Facility: CLINIC | Age: 71
End: 2021-04-02

## 2021-04-02 NOTE — TELEPHONE ENCOUNTER
[employer] calling----[phone]  Case # [id number]    Asking if it is necessary for her to take her Alprazolam and Tramadol ? ?

## 2021-04-05 ENCOUNTER — TELEPHONE (OUTPATIENT)
Dept: FAMILY MEDICINE CLINIC | Facility: CLINIC | Age: 71
End: 2021-04-05

## 2021-04-05 NOTE — TELEPHONE ENCOUNTER
Tasneem from [facility] called stated the prior auth for Aprazolam was approved.  Pt can call pharmacy to have it filled

## 2021-04-30 RX ORDER — MOMETASONE FUROATE AND FORMOTEROL FUMARATE DIHYDRATE 200; 5 UG/1; UG/1
AEROSOL RESPIRATORY (INHALATION)
Qty: 39 G | Refills: 3 | Status: SHIPPED | OUTPATIENT
Start: 2021-04-30

## 2021-04-30 RX ORDER — TRAMADOL HYDROCHLORIDE 50 MG/1
50 TABLET ORAL EVERY 6 HOURS PRN
Qty: 360 TABLET | Refills: 5 | Status: SHIPPED | OUTPATIENT
Start: 2021-04-30 | End: 2021-10-30

## 2021-05-06 ENCOUNTER — TELEPHONE (OUTPATIENT)
Dept: INTERNAL MEDICINE CLINIC | Facility: CLINIC | Age: 71
End: 2021-05-06

## 2021-05-06 DIAGNOSIS — H26.9 CATARACT OF BOTH EYES, UNSPECIFIED CATARACT TYPE: Primary | ICD-10-CM

## 2021-05-18 ENCOUNTER — OFFICE VISIT (OUTPATIENT)
Dept: INTERNAL MEDICINE CLINIC | Facility: CLINIC | Age: 71
End: 2021-05-18
Payer: MEDICARE

## 2021-05-18 VITALS
TEMPERATURE: 98 F | SYSTOLIC BLOOD PRESSURE: 94 MMHG | BODY MASS INDEX: 29.44 KG/M2 | DIASTOLIC BLOOD PRESSURE: 60 MMHG | WEIGHT: 160 LBS | HEART RATE: 68 BPM | HEIGHT: 62 IN

## 2021-05-18 DIAGNOSIS — Z78.0 POSTMENOPAUSAL: ICD-10-CM

## 2021-05-18 DIAGNOSIS — Z12.11 COLON CANCER SCREENING: ICD-10-CM

## 2021-05-18 DIAGNOSIS — I10 ESSENTIAL HYPERTENSION, BENIGN: ICD-10-CM

## 2021-05-18 DIAGNOSIS — Z00.00 ENCOUNTER FOR ANNUAL WELLNESS EXAM IN MEDICARE PATIENT: Primary | ICD-10-CM

## 2021-05-18 DIAGNOSIS — R73.9 HYPERGLYCEMIA: ICD-10-CM

## 2021-05-18 DIAGNOSIS — J43.1 PANLOBULAR EMPHYSEMA (HCC): ICD-10-CM

## 2021-05-18 DIAGNOSIS — R56.9 SEIZURE (HCC): ICD-10-CM

## 2021-05-18 DIAGNOSIS — E78.00 PURE HYPERCHOLESTEROLEMIA: ICD-10-CM

## 2021-05-18 PROBLEM — J44.9 CHRONIC OBSTRUCTIVE PULMONARY DISEASE (HCC): Status: RESOLVED | Noted: 2017-09-25 | Resolved: 2021-05-18

## 2021-05-18 PROBLEM — J44.1 ACUTE EXACERBATION OF CHRONIC OBSTRUCTIVE PULMONARY DISEASE (COPD) (HCC): Status: RESOLVED | Noted: 2017-09-15 | Resolved: 2021-05-18

## 2021-05-18 PROBLEM — J44.9 CHRONIC OBSTRUCTIVE PULMONARY DISEASE (HCC): Status: RESOLVED | Noted: 2017-09-25 | Resolved: 2021-01-01

## 2021-05-18 PROBLEM — Z12.31 VISIT FOR SCREENING MAMMOGRAM: Status: RESOLVED | Noted: 2020-12-14 | Resolved: 2021-01-01

## 2021-05-18 PROBLEM — R29.898 LEFT LEG WEAKNESS: Status: RESOLVED | Noted: 2020-04-30 | Resolved: 2021-05-18

## 2021-05-18 PROBLEM — Z12.31 VISIT FOR SCREENING MAMMOGRAM: Status: RESOLVED | Noted: 2020-12-14 | Resolved: 2021-05-18

## 2021-05-18 PROBLEM — E55.9 VITAMIN D DEFICIENCY: Status: RESOLVED | Noted: 2018-05-11 | Resolved: 2021-01-01

## 2021-05-18 PROBLEM — M41.9 SCOLIOSIS: Status: RESOLVED | Noted: 2017-08-25 | Resolved: 2021-05-18

## 2021-05-18 PROBLEM — M41.9 SCOLIOSIS: Status: RESOLVED | Noted: 2017-08-25 | Resolved: 2021-01-01

## 2021-05-18 PROBLEM — R29.898 LEFT LEG WEAKNESS: Status: RESOLVED | Noted: 2020-04-30 | Resolved: 2021-01-01

## 2021-05-18 PROBLEM — R91.1 NODULE OF APEX OF RIGHT LUNG: Status: RESOLVED | Noted: 2019-10-29 | Resolved: 2021-05-18

## 2021-05-18 PROBLEM — J44.1 ACUTE EXACERBATION OF CHRONIC OBSTRUCTIVE PULMONARY DISEASE (COPD) (HCC): Status: RESOLVED | Noted: 2017-09-15 | Resolved: 2021-01-01

## 2021-05-18 PROBLEM — C34.91: Status: RESOLVED | Noted: 2020-04-30 | Resolved: 2021-05-18

## 2021-05-18 PROBLEM — C34.91: Status: RESOLVED | Noted: 2020-04-30 | Resolved: 2021-01-01

## 2021-05-18 PROBLEM — R91.1 NODULE OF APEX OF RIGHT LUNG: Status: RESOLVED | Noted: 2019-10-29 | Resolved: 2021-01-01

## 2021-05-18 PROBLEM — E55.9 VITAMIN D DEFICIENCY: Status: RESOLVED | Noted: 2018-05-11 | Resolved: 2021-05-18

## 2021-05-18 PROCEDURE — G0439 PPPS, SUBSEQ VISIT: HCPCS | Performed by: INTERNAL MEDICINE

## 2021-05-18 NOTE — H&P
HPI:   Jeremias Macias is a 69year old female who presents for a Medicare Annual Wellness visit.     Patient Active Problem List:     Pure hypercholesterolemia     Essential hypertension, benign     Seizure ([address]     Panlobular emphysema ([address]     Colon c will?: Yes   Was Medicare Assessment Questionnaire completed by patient and sent to HIM for scanning? Yes    Please go to \"Cognitive Assessment\" under Medicare Assessment section in Charting, test patient and document.     Then, refresh your progress note TABLET(300 MG) BY MOUTH EVERY NIGHT 90 tablet 3   • VENLAFAXINE HCL  MG Oral Capsule SR 24 Hr TAKE 1 CAPSULE BY MOUTH EVERY DAY 90 capsule 3   • Albuterol Sulfate HFA (PROAIR HFA) 108 (90 Base) MCG/ACT Inhalation Aero Soln INHALE 2 PUFFS BY MOUTH AMY use: Yes      Comment: socially    Drug use: No    Occ: retired : no      REVIEW OF SYSTEMS:   GENERAL: feels well otherwise  SKIN: denies any unusual skin lesions  EYES: denies blurred vision or double vision  HEENT: denies nasal congestion, sinus for a Medicare Assessment. PLAN SUMMARY:   Colon cancer screening  Due for colonoscopy had 5 polyps in 2018 . Encounter for annual wellness exam in Medicare patient  Physical today   Sees kaden in June  Had labs in [place].    Had covid vaccines  Due f patient. Update Health Maintenance if applicable    Pap  Every two years There are no preventive care reminders to display for this patient.  Update Health Maintenance if applicable    Chlamydia  Annually if high risk No results found for: CHLAMYDIA No flow 10/31/2017 128 (H)    No flowsheet data found. Dilated Eye exam  Annually No flowsheet data found. No flowsheet data found. COPD      Spirometry Testing Annually No results found for this or any previous visit. No flowsheet data found.       GURWINDER 01/18/2019      Pneumovax 23          07/31/2020

## 2021-05-18 NOTE — ASSESSMENT & PLAN NOTE
Physical today   Sees ophtho in June  Had labs in [place]. Had covid vaccines  Due for colonoscopy . Had mammo in April  Due for bone density . No shingerix yet.

## 2021-06-01 ENCOUNTER — TELEPHONE (OUTPATIENT)
Dept: GASTROENTEROLOGY | Facility: CLINIC | Age: 71
End: 2021-06-01

## 2021-06-01 RX ORDER — VENLAFAXINE HYDROCHLORIDE 150 MG/1
CAPSULE, EXTENDED RELEASE ORAL
Qty: 90 CAPSULE | Refills: 3 | Status: SHIPPED | OUTPATIENT
Start: 2021-06-01

## 2021-06-01 NOTE — TELEPHONE ENCOUNTER
----- Message from Fransico Daniel RN sent at 8/21/2018  8:09 AM CDT -----  Regarding: 3 yr CLN recall  Entered into EPIC:Recall colon in 3 years per Dr. Zenon Del Toro. Last Colon done 8/16/18, next due 8/16/21. Snapshot updated. Letter mailed.

## 2021-06-22 ENCOUNTER — OFFICE VISIT (OUTPATIENT)
Dept: INTERNAL MEDICINE CLINIC | Facility: CLINIC | Age: 71
End: 2021-06-22
Payer: MEDICARE

## 2021-06-22 VITALS
BODY MASS INDEX: 28.52 KG/M2 | HEART RATE: 68 BPM | SYSTOLIC BLOOD PRESSURE: 122 MMHG | HEIGHT: 62 IN | WEIGHT: 155 LBS | TEMPERATURE: 98 F | DIASTOLIC BLOOD PRESSURE: 82 MMHG

## 2021-06-22 DIAGNOSIS — I10 ESSENTIAL HYPERTENSION, BENIGN: ICD-10-CM

## 2021-06-22 DIAGNOSIS — R56.9 SEIZURE (HCC): ICD-10-CM

## 2021-06-22 DIAGNOSIS — Z01.810 PREOPERATIVE CARDIOVASCULAR EXAMINATION: ICD-10-CM

## 2021-06-22 DIAGNOSIS — H25.011 CORTICAL AGE-RELATED CATARACT OF RIGHT EYE: Primary | ICD-10-CM

## 2021-06-22 DIAGNOSIS — Z01.818 PREOPERATIVE CLEARANCE: ICD-10-CM

## 2021-06-22 PROCEDURE — 93000 ELECTROCARDIOGRAM COMPLETE: CPT | Performed by: INTERNAL MEDICINE

## 2021-06-22 PROCEDURE — 99214 OFFICE O/P EST MOD 30 MIN: CPT | Performed by: INTERNAL MEDICINE

## 2021-06-22 NOTE — H&P
HPI:    Patient ID: Rodrigo Louise is a 66year old female. HPIpatient has bilateral cataracts and will have the right cataract extraction on June 29th . She denies chest pain , palpitations, increasing shortness of breath or seizure activity .    She Positive for visual disturbance. Negative for pain, discharge, redness and itching. Respiratory: Positive for shortness of breath. Negative for apnea, cough, chest tightness and wheezing.     Cardiovascular: Negative for chest pain, palpitations and leg s capsule 3   • Tiotropium Bromide Monohydrate (SPIRIVA RESPIMAT) 2.5 MCG/ACT Inhalation Aero Soln Inhale 1 puff into the lungs 2 (two) times daily.  12 g 3   • ATORVASTATIN 40 MG Oral Tab TAKE 1 TABLET(40 MG) BY MOUTH EVERY NIGHT 90 tablet 3   • IRBESARTAN 3 Cervical back: Normal range of motion. Skin:     General: Skin is warm. Coloration: Skin is not pale. Findings: No erythema or rash. Neurological:      Mental Status: She is alert and oriented to person, place, and time.       Motor: No a

## 2021-06-24 ENCOUNTER — TELEPHONE (OUTPATIENT)
Dept: LAB | Facility: HOSPITAL | Age: 71
End: 2021-06-24

## 2021-06-28 RX ORDER — IRBESARTAN 300 MG/1
TABLET ORAL
Qty: 90 TABLET | Refills: 3 | Status: SHIPPED | OUTPATIENT
Start: 2021-06-28

## 2021-07-17 RX ORDER — TIOTROPIUM BROMIDE INHALATION SPRAY 3.12 UG/1
SPRAY, METERED RESPIRATORY (INHALATION)
Qty: 12 G | Refills: 3 | Status: SHIPPED | OUTPATIENT
Start: 2021-07-17

## 2021-09-21 ENCOUNTER — LAB ENCOUNTER (OUTPATIENT)
Dept: LAB | Age: 71
End: 2021-09-21
Attending: INTERNAL MEDICINE
Payer: MEDICARE

## 2021-09-21 ENCOUNTER — OFFICE VISIT (OUTPATIENT)
Dept: INTERNAL MEDICINE CLINIC | Facility: CLINIC | Age: 71
End: 2021-09-21
Payer: MEDICARE

## 2021-09-21 VITALS
DIASTOLIC BLOOD PRESSURE: 80 MMHG | BODY MASS INDEX: 27.42 KG/M2 | WEIGHT: 149 LBS | SYSTOLIC BLOOD PRESSURE: 130 MMHG | HEART RATE: 68 BPM | TEMPERATURE: 97 F | HEIGHT: 62 IN

## 2021-09-21 DIAGNOSIS — I65.21 STENOSIS OF RIGHT CAROTID ARTERY: ICD-10-CM

## 2021-09-21 DIAGNOSIS — R73.9 HYPERGLYCEMIA: ICD-10-CM

## 2021-09-21 DIAGNOSIS — J96.11 CHRONIC RESPIRATORY FAILURE WITH HYPOXIA (HCC): ICD-10-CM

## 2021-09-21 DIAGNOSIS — E78.00 PURE HYPERCHOLESTEROLEMIA: ICD-10-CM

## 2021-09-21 DIAGNOSIS — J43.1 PANLOBULAR EMPHYSEMA (HCC): ICD-10-CM

## 2021-09-21 DIAGNOSIS — R56.9 SEIZURE (HCC): ICD-10-CM

## 2021-09-21 DIAGNOSIS — I10 ESSENTIAL HYPERTENSION, BENIGN: ICD-10-CM

## 2021-09-21 DIAGNOSIS — I10 ESSENTIAL HYPERTENSION, BENIGN: Primary | ICD-10-CM

## 2021-09-21 LAB
ALBUMIN SERPL-MCNC: 3.3 G/DL (ref 3.4–5)
ALBUMIN/GLOB SERPL: 0.8 {RATIO} (ref 1–2)
ALP LIVER SERPL-CCNC: 117 U/L
ALT SERPL-CCNC: 28 U/L
ANION GAP SERPL CALC-SCNC: 9 MMOL/L (ref 0–18)
AST SERPL-CCNC: 24 U/L (ref 15–37)
BASOPHILS # BLD AUTO: 0.03 X10(3) UL (ref 0–0.2)
BASOPHILS NFR BLD AUTO: 0.5 %
BILIRUB SERPL-MCNC: 0.2 MG/DL (ref 0.1–2)
BUN BLD-MCNC: 16 MG/DL (ref 7–18)
BUN/CREAT SERPL: 28.6 (ref 10–20)
CALCIUM BLD-MCNC: 9.6 MG/DL (ref 8.5–10.1)
CHLORIDE SERPL-SCNC: 105 MMOL/L (ref 98–112)
CHOLEST SERPL-MCNC: 201 MG/DL (ref ?–200)
CO2 SERPL-SCNC: 23 MMOL/L (ref 21–32)
CREAT BLD-MCNC: 0.56 MG/DL
DEPRECATED RDW RBC AUTO: 48 FL (ref 35.1–46.3)
EOSINOPHIL # BLD AUTO: 0.18 X10(3) UL (ref 0–0.7)
EOSINOPHIL NFR BLD AUTO: 3 %
ERYTHROCYTE [DISTWIDTH] IN BLOOD BY AUTOMATED COUNT: 13 % (ref 11–15)
EST. AVERAGE GLUCOSE BLD GHB EST-MCNC: 97 MG/DL (ref 68–126)
GLOBULIN PLAS-MCNC: 3.9 G/DL (ref 2.8–4.4)
GLUCOSE BLD-MCNC: 82 MG/DL (ref 70–99)
HBA1C MFR BLD HPLC: 5 % (ref ?–5.7)
HCT VFR BLD AUTO: 36.8 %
HDLC SERPL-MCNC: 68 MG/DL (ref 40–59)
HGB BLD-MCNC: 12 G/DL
IMM GRANULOCYTES # BLD AUTO: 0.03 X10(3) UL (ref 0–1)
IMM GRANULOCYTES NFR BLD: 0.5 %
LDLC SERPL CALC-MCNC: 118 MG/DL (ref ?–100)
LYMPHOCYTES # BLD AUTO: 0.39 X10(3) UL (ref 1–4)
LYMPHOCYTES NFR BLD AUTO: 6.5 %
MCH RBC QN AUTO: 32.7 PG (ref 26–34)
MCHC RBC AUTO-ENTMCNC: 32.6 G/DL (ref 31–37)
MCV RBC AUTO: 100.3 FL
MONOCYTES # BLD AUTO: 0.6 X10(3) UL (ref 0.1–1)
MONOCYTES NFR BLD AUTO: 10 %
NEUTROPHILS # BLD AUTO: 4.76 X10 (3) UL (ref 1.5–7.7)
NEUTROPHILS # BLD AUTO: 4.76 X10(3) UL (ref 1.5–7.7)
NEUTROPHILS NFR BLD AUTO: 79.5 %
NONHDLC SERPL-MCNC: 133 MG/DL (ref ?–130)
OSMOLALITY SERPL CALC.SUM OF ELEC: 284 MOSM/KG (ref 275–295)
PATIENT FASTING Y/N/NP: YES
PATIENT FASTING Y/N/NP: YES
PHENYTOIN SERPL-MCNC: 9.9 UG/ML (ref 10–20)
PLATELET # BLD AUTO: 269 10(3)UL (ref 150–450)
POTASSIUM SERPL-SCNC: 3.8 MMOL/L (ref 3.5–5.1)
PROT SERPL-MCNC: 7.2 G/DL (ref 6.4–8.2)
RBC # BLD AUTO: 3.67 X10(6)UL
SODIUM SERPL-SCNC: 137 MMOL/L (ref 136–145)
T4 FREE SERPL-MCNC: 0.8 NG/DL (ref 0.8–1.7)
TRIGL SERPL-MCNC: 81 MG/DL (ref 30–149)
TSI SER-ACNC: 1.82 MIU/ML (ref 0.36–3.74)
VLDLC SERPL CALC-MCNC: 14 MG/DL (ref 0–30)
WBC # BLD AUTO: 6 X10(3) UL (ref 4–11)

## 2021-09-21 PROCEDURE — 84439 ASSAY OF FREE THYROXINE: CPT

## 2021-09-21 PROCEDURE — 85025 COMPLETE CBC W/AUTO DIFF WBC: CPT

## 2021-09-21 PROCEDURE — 84443 ASSAY THYROID STIM HORMONE: CPT

## 2021-09-21 PROCEDURE — G0008 ADMIN INFLUENZA VIRUS VAC: HCPCS | Performed by: INTERNAL MEDICINE

## 2021-09-21 PROCEDURE — 80053 COMPREHEN METABOLIC PANEL: CPT

## 2021-09-21 PROCEDURE — 99214 OFFICE O/P EST MOD 30 MIN: CPT | Performed by: INTERNAL MEDICINE

## 2021-09-21 PROCEDURE — 83036 HEMOGLOBIN GLYCOSYLATED A1C: CPT | Performed by: INTERNAL MEDICINE

## 2021-09-21 PROCEDURE — 80185 ASSAY OF PHENYTOIN TOTAL: CPT

## 2021-09-21 PROCEDURE — 80061 LIPID PANEL: CPT

## 2021-09-21 PROCEDURE — 90662 IIV NO PRSV INCREASED AG IM: CPT | Performed by: INTERNAL MEDICINE

## 2021-09-21 PROCEDURE — 36415 COLL VENOUS BLD VENIPUNCTURE: CPT

## 2021-09-21 NOTE — PROGRESS NOTES
HPI:    Patient ID: Pam Galdamez is a 66year old female. HPI patient is here for follow up , no chest pain  No increase in shortness of breath , no swelling.  .   Patient had right cataract surgery complicated by bleeding afterwards and loss of vision Take 1 tablet (50 mg total) by mouth every 6 (six) hours as needed for Pain.  360 tablet 5   • ALPRAZolam 0.25 MG Oral Tab TAKE 1 TABLET BY MOUTH EVERY MORNING, AND 2 TABLETS EVERY EVENING 270 tablet 1   • CARVEDILOL 25 MG Oral Tab TAKE 1 TABLET BY MOUTH TW sounds. No murmur heard. No gallop. Pulmonary:      Effort: Pulmonary effort is normal. No respiratory distress. Breath sounds: Normal breath sounds. No wheezing or rales. Chest:      Chest wall: No tenderness.    Abdominal:      General: There i

## 2021-09-21 NOTE — ASSESSMENT & PLAN NOTE
Lipids today , fasting. 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 Will need to see neurology prior to next surgery on the left cataract.

## 2021-09-23 RX ORDER — ROSUVASTATIN CALCIUM 40 MG/1
40 TABLET, COATED ORAL NIGHTLY
COMMUNITY
End: 2021-09-23

## 2021-09-23 RX ORDER — ATORVASTATIN CALCIUM 40 MG/1
TABLET, FILM COATED ORAL
Qty: 90 TABLET | Refills: 3 | Status: SHIPPED | OUTPATIENT
Start: 2021-09-23 | End: 2021-09-23 | Stop reason: ALTCHOICE

## 2021-09-23 RX ORDER — HYDROCHLOROTHIAZIDE 12.5 MG/1
CAPSULE, GELATIN COATED ORAL
Qty: 90 CAPSULE | Refills: 3 | Status: SHIPPED | OUTPATIENT
Start: 2021-09-23

## 2021-09-23 RX ORDER — ROSUVASTATIN CALCIUM 40 MG/1
40 TABLET, COATED ORAL NIGHTLY
Qty: 90 TABLET | Refills: 3 | Status: SHIPPED | OUTPATIENT
Start: 2021-09-23 | End: 2022-01-06 | Stop reason: ALTCHOICE

## 2021-09-28 ENCOUNTER — HOSPITAL ENCOUNTER (OUTPATIENT)
Dept: CT IMAGING | Facility: HOSPITAL | Age: 71
Discharge: HOME | End: 2021-09-28
Attending: RADIOLOGY
Payer: MEDICARE

## 2021-09-28 DIAGNOSIS — C34.11 MALIGNANT NEOPLASM OF UPPER LOBE OF RIGHT LUNG (HCC): ICD-10-CM

## 2021-09-28 PROCEDURE — 71250 CT THORAX DX C-: CPT | Performed by: RADIOLOGY

## 2021-10-07 ENCOUNTER — HOSPITAL ENCOUNTER (OUTPATIENT)
Dept: RADIATION ONCOLOGY | Facility: HOSPITAL | Age: 71
Discharge: HOME | End: 2021-10-07
Attending: RADIOLOGY
Payer: MEDICARE

## 2021-10-07 DIAGNOSIS — C34.11 MALIGNANT NEOPLASM OF UPPER LOBE OF RIGHT LUNG (HCC): Primary | ICD-10-CM

## 2021-10-07 NOTE — TELEPHONE ENCOUNTER
Patient was suppose to have a phone visit today at [address] [place] [address], patient stated she has been by the phone waiting and did not receive a call, she can be reached at [phone]

## 2021-10-13 NOTE — TELEPHONE ENCOUNTER
Please review. Protocol failed / No protocol.     Requested Prescriptions   Pending Prescriptions Disp Refills    ALPRAZOLAM 0.25 MG Oral Tab [Pharmacy Med Name: ALPRAZOLAM 0.25MG TABLETS] 270 tablet 0     Sig: TAKE 1 TABLET BY MOUTH EVERY MORNING, AND 2 T

## 2021-10-18 RX ORDER — ALPRAZOLAM 0.25 MG/1
TABLET ORAL
Qty: 270 TABLET | Refills: 0 | Status: SHIPPED | OUTPATIENT
Start: 2021-10-18 | End: 2022-01-17

## 2021-10-28 RX ORDER — PANTOPRAZOLE SODIUM 40 MG/1
TABLET, DELAYED RELEASE ORAL
Qty: 90 TABLET | Refills: 3 | Status: SHIPPED | OUTPATIENT
Start: 2021-10-28

## 2021-10-30 NOTE — TELEPHONE ENCOUNTER
Patient calling regarding refill request.   Patient was given tramadol 50 mg (3 month supply) on 4/30/21 with 5 refills. Per patient, she was told by the pharmacy shed cough not get refill.      RN called pharmacy and was informed that Tramadol script is

## 2021-11-01 RX ORDER — TRAMADOL HYDROCHLORIDE 50 MG/1
TABLET ORAL
Qty: 360 TABLET | Refills: 1 | Status: SHIPPED | OUTPATIENT
Start: 2021-11-01

## 2021-11-01 NOTE — TELEPHONE ENCOUNTER
Patient called back again regarding Tramadol refill. Will reroute to Dr. Anaid Salguero as patient is completely out of medication now.

## 2021-11-19 ENCOUNTER — HOSPITAL ENCOUNTER (OUTPATIENT)
Dept: NUCLEAR MEDICINE | Facility: HOSPITAL | Age: 71
Discharge: HOME | End: 2021-11-19
Attending: RADIOLOGY
Payer: MEDICARE

## 2021-11-19 DIAGNOSIS — C34.11 MALIGNANT NEOPLASM OF UPPER LOBE OF RIGHT LUNG (HCC): ICD-10-CM

## 2021-11-19 PROCEDURE — 78815 PET IMAGE W/CT SKULL-THIGH: CPT | Performed by: RADIOLOGY

## 2021-11-19 PROCEDURE — 82962 GLUCOSE BLOOD TEST: CPT

## 2021-12-02 ENCOUNTER — TELEPHONE (OUTPATIENT)
Dept: INTERNAL MEDICINE CLINIC | Facility: CLINIC | Age: 71
End: 2021-12-02

## 2021-12-02 ENCOUNTER — HOSPITAL ENCOUNTER (OUTPATIENT)
Dept: ULTRASOUND IMAGING | Facility: HOSPITAL | Age: 71
Discharge: HOME | End: 2021-12-02
Attending: INTERNAL MEDICINE
Payer: MEDICARE

## 2021-12-02 DIAGNOSIS — I65.21 STENOSIS OF RIGHT CAROTID ARTERY: ICD-10-CM

## 2021-12-02 PROCEDURE — 93880 EXTRACRANIAL BILAT STUDY: CPT | Performed by: INTERNAL MEDICINE

## 2021-12-02 NOTE — TELEPHONE ENCOUNTER
Dr. Daniel Grajeda - pt interested in CTA. See Carotid Ultrasound Doppler results. RN advised ER if symptoms develop. Please reply to pool: EM RN TRIAGE    RN spoke with patient. Patient's date of birth and full name both confirmed.    RN informed patient of

## 2021-12-02 NOTE — TELEPHONE ENCOUNTER
I had not seen pt and dont know her so I cant just order the test( I am just the on call MD today). She should see any of available MD in the clinic for evaluation  who can then order the test for the patient if her pcp is not available.

## 2021-12-02 NOTE — TELEPHONE ENCOUNTER
Brittney Guerrero from [address] [facility] calling to report STAT Carotid doppler US result is available for review and is abnormal. Please review and advise for Dr Fannie Lovell who is out of office until 12/9/21.

## 2021-12-02 NOTE — TELEPHONE ENCOUNTER
Pt called , relayed Dr message   stated she had abn carotid study was advised she need CTA  Stated she wants this done before her Dr comes back next week 12/9/21    2.  Large amount of shadowing plaque in the left carotid bulb and proximal left ICA but no s

## 2021-12-02 NOTE — TELEPHONE ENCOUNTER
Pt has >70% stenosis on right carotid; test was ordered 2mos ago by Dr Jenna Miller; pt should ffup; with  Him tomorrow to discuss futher treatment/recommendation.(usually when 70% and above, will need surgery but this needs to be discussed by pcp with pt)

## 2021-12-06 RX ORDER — DOCUSATE SODIUM 100 MG/1
100 CAPSULE, LIQUID FILLED ORAL 2 TIMES DAILY PRN
Qty: 180 CAPSULE | Refills: 1 | Status: SHIPPED | OUTPATIENT
Start: 2021-12-06

## 2021-12-06 NOTE — TELEPHONE ENCOUNTER
Refill passed per [facility] protocol.    Requested Prescriptions   Pending Prescriptions Disp Refills    DOCUSATE SODIUM 100 MG Oral Cap [Pharmacy Med Name: DOCUSATE SOD 100MG CAPSULES] 180 capsule 0     Sig: TAKE ONE CAPSULE BY MOUTH TWICE DAILY AS N

## 2021-12-06 NOTE — TELEPHONE ENCOUNTER
Made conference call with patient and Garret House from Dr. Rosalinda Waetrs office, they can see patient at [address] [place] [address] on 12/10 @ 12:40pm. Patient needs to find transportation but she will call Dr. Rosalinda Waters office if she is unable to make it.

## 2021-12-06 NOTE — TELEPHONE ENCOUNTER
Patient called regarding her results from er Pet scan and when she should schedule another appointment? Please call to advise when able. Thank you.

## 2021-12-07 DIAGNOSIS — C34.11 MALIGNANT NEOPLASM OF UPPER LOBE OF RIGHT LUNG (HCC): Primary | ICD-10-CM

## 2021-12-22 RX ORDER — CLOPIDOGREL BISULFATE 75 MG/1
TABLET ORAL
Qty: 90 TABLET | Refills: 3 | Status: SHIPPED | OUTPATIENT
Start: 2021-12-22

## 2021-12-28 ENCOUNTER — TELEPHONE (OUTPATIENT)
Dept: INTERNAL MEDICINE CLINIC | Facility: CLINIC | Age: 71
End: 2021-12-28

## 2021-12-28 NOTE — TELEPHONE ENCOUNTER
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 She has a left facial droop and dysarthria. She will be getting TPA, and staying in ICU tonight.

## 2021-12-30 ENCOUNTER — TELEPHONE (OUTPATIENT)
Dept: INTERNAL MEDICINE CLINIC | Facility: CLINIC | Age: 71
End: 2021-12-30

## 2021-12-30 NOTE — TELEPHONE ENCOUNTER
Patient was discharged from the hospital, had a small stroke. Patient was discharged with a blocked carotid artery and needs to see Dr. Ander Gaitan as soon as possible. Dr. Ander Gaitan next available is not until 01/10/22.  Niece requesting a sooner appointment a

## 2022-01-01 ENCOUNTER — TELEPHONE (OUTPATIENT)
Dept: INTERNAL MEDICINE CLINIC | Facility: CLINIC | Age: 72
End: 2022-01-01

## 2022-01-01 ENCOUNTER — TELEPHONE (OUTPATIENT)
Dept: RADIATION ONCOLOGY | Facility: HOSPITAL | Age: 72
End: 2022-01-01

## 2022-01-01 RX ORDER — ALPRAZOLAM 0.25 MG/1
TABLET ORAL
Qty: 270 TABLET | Refills: 1 | Status: SHIPPED | OUTPATIENT
Start: 2022-01-01

## 2022-01-01 RX ORDER — TRAMADOL HYDROCHLORIDE 50 MG/1
TABLET ORAL
Qty: 360 TABLET | Refills: 0 | OUTPATIENT
Start: 2022-01-01

## 2022-01-01 RX ORDER — TRAMADOL HYDROCHLORIDE 50 MG/1
50 TABLET ORAL EVERY 6 HOURS PRN
Qty: 360 TABLET | Refills: 1 | OUTPATIENT
Start: 2022-01-01

## 2022-01-01 RX ORDER — CARVEDILOL 25 MG/1
25 TABLET ORAL 2 TIMES DAILY WITH MEALS
Qty: 180 TABLET | Refills: 1 | Status: SHIPPED | OUTPATIENT
Start: 2022-01-01

## 2022-01-01 RX ORDER — MOMETASONE FUROATE AND FORMOTEROL FUMARATE DIHYDRATE 200; 5 UG/1; UG/1
2 AEROSOL RESPIRATORY (INHALATION) 2 TIMES DAILY
Qty: 39 G | Refills: 1 | Status: SHIPPED | OUTPATIENT
Start: 2022-01-01

## 2022-01-06 ENCOUNTER — OFFICE VISIT (OUTPATIENT)
Dept: INTERNAL MEDICINE CLINIC | Facility: CLINIC | Age: 72
End: 2022-01-06
Payer: MEDICARE

## 2022-01-06 ENCOUNTER — LAB ENCOUNTER (OUTPATIENT)
Dept: LAB | Age: 72
End: 2022-01-06
Attending: INTERNAL MEDICINE
Payer: MEDICARE

## 2022-01-06 VITALS
TEMPERATURE: 97 F | HEIGHT: 62 IN | HEART RATE: 72 BPM | SYSTOLIC BLOOD PRESSURE: 172 MMHG | BODY MASS INDEX: 27 KG/M2 | DIASTOLIC BLOOD PRESSURE: 80 MMHG

## 2022-01-06 DIAGNOSIS — I65.21 STENOSIS OF RIGHT CAROTID ARTERY: ICD-10-CM

## 2022-01-06 DIAGNOSIS — R73.9 HYPERGLYCEMIA: ICD-10-CM

## 2022-01-06 DIAGNOSIS — D50.0 IRON DEFICIENCY ANEMIA DUE TO CHRONIC BLOOD LOSS: ICD-10-CM

## 2022-01-06 DIAGNOSIS — I10 ESSENTIAL HYPERTENSION, BENIGN: Primary | ICD-10-CM

## 2022-01-06 DIAGNOSIS — E78.00 PURE HYPERCHOLESTEROLEMIA: ICD-10-CM

## 2022-01-06 DIAGNOSIS — E83.42 HYPOMAGNESEMIA: ICD-10-CM

## 2022-01-06 DIAGNOSIS — R56.9 SEIZURE (HCC): ICD-10-CM

## 2022-01-06 LAB
ALBUMIN SERPL-MCNC: 3.3 G/DL (ref 3.4–5)
ALBUMIN/GLOB SERPL: 0.9 {RATIO} (ref 1–2)
ALP LIVER SERPL-CCNC: 117 U/L
ALT SERPL-CCNC: 39 U/L
ANION GAP SERPL CALC-SCNC: 4 MMOL/L (ref 0–18)
AST SERPL-CCNC: 25 U/L (ref 15–37)
BASOPHILS # BLD AUTO: 0.04 X10(3) UL (ref 0–0.2)
BASOPHILS NFR BLD AUTO: 0.7 %
BILIRUB SERPL-MCNC: 0.3 MG/DL (ref 0.1–2)
BUN BLD-MCNC: 15 MG/DL (ref 7–18)
BUN/CREAT SERPL: 25.9 (ref 10–20)
CALCIUM BLD-MCNC: 10 MG/DL (ref 8.5–10.1)
CHLORIDE SERPL-SCNC: 106 MMOL/L (ref 98–112)
CO2 SERPL-SCNC: 27 MMOL/L (ref 21–32)
CREAT BLD-MCNC: 0.58 MG/DL
DEPRECATED RDW RBC AUTO: 49.1 FL (ref 35.1–46.3)
EOSINOPHIL # BLD AUTO: 0.18 X10(3) UL (ref 0–0.7)
EOSINOPHIL NFR BLD AUTO: 3.1 %
ERYTHROCYTE [DISTWIDTH] IN BLOOD BY AUTOMATED COUNT: 13 % (ref 11–15)
FASTING STATUS PATIENT QL REPORTED: NO
GLOBULIN PLAS-MCNC: 3.5 G/DL (ref 2.8–4.4)
GLUCOSE BLD-MCNC: 97 MG/DL (ref 70–99)
HCT VFR BLD AUTO: 34.5 %
HGB BLD-MCNC: 11.2 G/DL
IMM GRANULOCYTES # BLD AUTO: 0.02 X10(3) UL (ref 0–1)
IMM GRANULOCYTES NFR BLD: 0.3 %
LYMPHOCYTES # BLD AUTO: 0.48 X10(3) UL (ref 1–4)
LYMPHOCYTES NFR BLD AUTO: 8.2 %
MAGNESIUM SERPL-MCNC: 2.1 MG/DL (ref 1.6–2.6)
MCH RBC QN AUTO: 33.1 PG (ref 26–34)
MCHC RBC AUTO-ENTMCNC: 32.5 G/DL (ref 31–37)
MCV RBC AUTO: 102.1 FL
MONOCYTES # BLD AUTO: 0.58 X10(3) UL (ref 0.1–1)
MONOCYTES NFR BLD AUTO: 9.9 %
NEUTROPHILS # BLD AUTO: 4.56 X10 (3) UL (ref 1.5–7.7)
NEUTROPHILS # BLD AUTO: 4.56 X10(3) UL (ref 1.5–7.7)
NEUTROPHILS NFR BLD AUTO: 77.8 %
OSMOLALITY SERPL CALC.SUM OF ELEC: 285 MOSM/KG (ref 275–295)
PLATELET # BLD AUTO: 312 10(3)UL (ref 150–450)
POTASSIUM SERPL-SCNC: 4.7 MMOL/L (ref 3.5–5.1)
PROT SERPL-MCNC: 6.8 G/DL (ref 6.4–8.2)
RBC # BLD AUTO: 3.38 X10(6)UL
SODIUM SERPL-SCNC: 137 MMOL/L (ref 136–145)
WBC # BLD AUTO: 5.9 X10(3) UL (ref 4–11)

## 2022-01-06 PROCEDURE — 80053 COMPREHEN METABOLIC PANEL: CPT

## 2022-01-06 PROCEDURE — 99214 OFFICE O/P EST MOD 30 MIN: CPT | Performed by: INTERNAL MEDICINE

## 2022-01-06 PROCEDURE — 36415 COLL VENOUS BLD VENIPUNCTURE: CPT

## 2022-01-06 PROCEDURE — 1111F DSCHRG MED/CURRENT MED MERGE: CPT | Performed by: INTERNAL MEDICINE

## 2022-01-06 PROCEDURE — 85025 COMPLETE CBC W/AUTO DIFF WBC: CPT

## 2022-01-06 PROCEDURE — 83735 ASSAY OF MAGNESIUM: CPT

## 2022-01-06 RX ORDER — ASPIRIN 81 MG/1
81 TABLET ORAL DAILY
COMMUNITY
Start: 2021-12-31

## 2022-01-06 RX ORDER — ATORVASTATIN CALCIUM 80 MG/1
80 TABLET, FILM COATED ORAL NIGHTLY
COMMUNITY
Start: 2021-12-30 | End: 2022-01-17

## 2022-01-06 RX ORDER — FOLIC ACID 1 MG/1
1 TABLET ORAL DAILY
COMMUNITY
Start: 2021-12-30

## 2022-01-06 NOTE — PROGRESS NOTES
HPI:    Patient ID: Radha Agudelo is a 66year old female. HPIpatient is doing well, no chest pain , no shortness of breath , no swelling. She was in the hospital on DEc.  29 th with a right facial droop and aphasia , she received TPA and all symptoms mouth daily. • CLOPIDOGREL 75 MG Oral Tab TAKE 1 TABLET BY MOUTH EVERY DAY 90 tablet 3   • docusate sodium 100 MG Oral Cap Take 1 capsule (100 mg total) by mouth 2 (two) times daily as needed.  180 capsule 1   • TRAMADOL 50 MG Oral Tab TAKE 1 TABLET(50 Cardiovascular:      Rate and Rhythm: Normal rate and regular rhythm. Heart sounds: Normal heart sounds. No murmur heard. No gallop. Pulmonary:      Effort: Pulmonary effort is normal. No respiratory distress.       Breath sounds: Normal breath s

## 2022-01-14 ENCOUNTER — LAB ENCOUNTER (OUTPATIENT)
Dept: LAB | Facility: HOSPITAL | Age: 72
End: 2022-01-14
Attending: SURGERY
Payer: MEDICARE

## 2022-01-14 ENCOUNTER — TELEPHONE (OUTPATIENT)
Dept: PULMONOLOGY | Facility: CLINIC | Age: 72
End: 2022-01-14

## 2022-01-14 ENCOUNTER — HOSPITAL ENCOUNTER (OUTPATIENT)
Dept: CT IMAGING | Facility: HOSPITAL | Age: 72
Discharge: HOME | End: 2022-01-14
Attending: SURGERY
Payer: MEDICARE

## 2022-01-14 DIAGNOSIS — I77.9 CAROTID ARTERY DISEASE WITHOUT CEREBRAL INFARCTION (HCC): ICD-10-CM

## 2022-01-14 LAB
BUN BLD-MCNC: 15 MG/DL (ref 7–18)
CREAT BLD-MCNC: 0.53 MG/DL
PA ADP PRP-ACNC: 52

## 2022-01-14 PROCEDURE — 85576 BLOOD PLATELET AGGREGATION: CPT

## 2022-01-14 PROCEDURE — 82565 ASSAY OF CREATININE: CPT

## 2022-01-14 PROCEDURE — 36415 COLL VENOUS BLD VENIPUNCTURE: CPT

## 2022-01-14 PROCEDURE — 70498 CT ANGIOGRAPHY NECK: CPT | Performed by: SURGERY

## 2022-01-14 PROCEDURE — 84520 ASSAY OF UREA NITROGEN: CPT

## 2022-01-14 NOTE — TELEPHONE ENCOUNTER
Patient last seen by me in February 2020  Patient with lung cancer seems worse on last neck CT was done by vascular  Radiation oncology Dr. Giorgio De La Cruz he want me to see the patient back  I called the patient and her home and I informed her about the concern fro

## 2022-01-14 NOTE — TELEPHONE ENCOUNTER
Spoke with patient follow up appointment scheduled with Dr. Romana Pellant on 2/11/22 at 1:45pm. Patient verbalized understanding.

## 2022-01-17 ENCOUNTER — TELEPHONE (OUTPATIENT)
Dept: RADIATION ONCOLOGY | Facility: HOSPITAL | Age: 72
End: 2022-01-17

## 2022-01-17 RX ORDER — ATORVASTATIN CALCIUM 80 MG/1
80 TABLET, FILM COATED ORAL NIGHTLY
Qty: 90 TABLET | Refills: 1 | Status: SHIPPED | OUTPATIENT
Start: 2022-01-17

## 2022-01-17 NOTE — TELEPHONE ENCOUNTER
Per patient she needs refill on her Atorvastatin for 90 days supply. Current Outpatient Medications   Medication Sig Dispense Refill   •       • atorvastatin 80 MG Oral Tab Take 80 mg by mouth nightly.

## 2022-01-17 NOTE — TELEPHONE ENCOUNTER
Refill passed per [place] [facility] protocol. Requested Prescriptions   Pending Prescriptions Disp Refills    atorvastatin 80 MG Oral Tab 90 tablet 1     Sig: Take 1 tablet (80 mg total) by mouth nightly.         Cholesterol Medication Protocol Passed - 1/

## 2022-01-17 NOTE — PROGRESS NOTES
Called pt and discussed the results of the CTA of the carotid arteries. No intervention is indicated as the artery has occluded. Will need yearly carotid duplex every December which we will coordinate.

## 2022-01-20 ENCOUNTER — TELEPHONE (OUTPATIENT)
Dept: INTERNAL MEDICINE CLINIC | Facility: CLINIC | Age: 72
End: 2022-01-20

## 2022-01-20 DIAGNOSIS — Z12.31 BREAST CANCER SCREENING BY MAMMOGRAM: Primary | ICD-10-CM

## 2022-01-20 NOTE — TELEPHONE ENCOUNTER
Chart reviewed. Last Mammogram 1/12/21; Negative; recommended routine screening in 12 months. Patient is due. Ordered mammogrm per triage protocol. Patient contacted.  Please call Central Scheduling at [phone] to schedule your test.

## 2022-01-20 NOTE — TELEPHONE ENCOUNTER
Valentina from [facility] Scheduling calling because patient is requesting an order for a mammogram. Patient states she received a [employer] message stating she is due for a mammogram. Patient requesting call back once order is placed.

## 2022-02-11 ENCOUNTER — OFFICE VISIT (OUTPATIENT)
Dept: PULMONOLOGY | Facility: CLINIC | Age: 72
End: 2022-02-11
Payer: MEDICARE

## 2022-02-11 VITALS
SYSTOLIC BLOOD PRESSURE: 147 MMHG | OXYGEN SATURATION: 98 % | DIASTOLIC BLOOD PRESSURE: 86 MMHG | HEIGHT: 62 IN | BODY MASS INDEX: 27.38 KG/M2 | HEART RATE: 66 BPM | WEIGHT: 148.81 LBS | RESPIRATION RATE: 14 BRPM

## 2022-02-11 DIAGNOSIS — J43.2 CENTRILOBULAR EMPHYSEMA (HCC): ICD-10-CM

## 2022-02-11 DIAGNOSIS — C34.11 MALIGNANT NEOPLASM OF UPPER LOBE OF RIGHT LUNG (HCC): Primary | ICD-10-CM

## 2022-02-11 PROCEDURE — 99214 OFFICE O/P EST MOD 30 MIN: CPT | Performed by: INTERNAL MEDICINE

## 2022-02-14 ENCOUNTER — NURSE TRIAGE (OUTPATIENT)
Dept: INTERNAL MEDICINE CLINIC | Facility: CLINIC | Age: 72
End: 2022-02-14

## 2022-03-03 NOTE — TELEPHONE ENCOUNTER
Refill passed per [employer] protocol.     Requested Prescriptions   Pending Prescriptions Disp Refills    CARVEDILOL 25 MG Oral Tab [Pharmacy Med Name: CARVEDILOL 25MG TABLETS] 180 tablet 3     Sig: TAKE 1 TABLET BY MOUTH TWICE DAILY WITH FOOD        Hypertensive Medications Protocol Passed - 3/3/2022 11:06 AM        Passed - CMP or BMP in past 12 months        Passed - Appointment in past 6 or next 3 months        Passed - GFR Non- > 50     Lab Results   Component Value Date    GFRNAA 95 01/14/2022                     Recent Outpatient Visits              2 weeks ago Malignant neoplasm of upper lobe of right lung [facility])    Yvonne, [address] [facility] [place] Puja Robert MD    Office Visit    1 month ago Essential hypertension, [facility], [address] Rachel Mcghee MD    Office Visit    2 months ago Carotid artery disease without cerebral infarction [facility])    Vascular - [address] [facility] [place] Najjar, Estella Engman, MD    Office Visit    5 months ago Essential hypertension, benign    [facility], Donta Cervantes MD    Office Visit    8 months ago Cortical age-related cataract of right eye    [place] [employer] [facility], [address] Rachel Mcghee MD    Office Visit          Future Appointments         Provider Department Appt Notes    In 1 week [facility] OF THE [address]     In 1 week [address] [place] [address] Rd     In 2 weeks Rachel Mcghee MD [facility], [address] 6 month f/u

## 2022-03-09 ENCOUNTER — TELEPHONE (OUTPATIENT)
Dept: HEMATOLOGY/ONCOLOGY | Facility: HOSPITAL | Age: 72
End: 2022-03-09

## 2022-03-09 NOTE — TELEPHONE ENCOUNTER
Therese calling very tear full. She received her biopsy reports and they where not good. I did give her our fax number so dr's off can fax them.  Thank you aby

## 2022-03-10 ENCOUNTER — TELEPHONE (OUTPATIENT)
Dept: RADIATION ONCOLOGY | Facility: HOSPITAL | Age: 72
End: 2022-03-10

## 2022-03-10 NOTE — TELEPHONE ENCOUNTER
Patient calling and went to ER yesterday  Due to lump on neck. Was sent to ENT  Who did biopsy  And advised it appeared to be mets from the Lung  cancer.

## 2022-03-10 NOTE — TELEPHONE ENCOUNTER
RN called patient back regarding message with call center. Stated that Dr. Eloisa Castro is out of office today. We have not yet received fax with report, will reach out once received and MD is in office. Pt states her understanding.

## 2022-03-10 NOTE — TELEPHONE ENCOUNTER
Patient called and was told by Dr. Marcus Bennett. Anuj, [address], [address] [place] [address]. Patient said that  Dr. Leslie called patient with the biopsy results. He received the results and read patient the results over the phone. However, patient did not  understand the results. Patient will call Dr. Denise Feliciano office and ask him to Viviana Pradhan the biopsy results to Dr. Vincenzo Bennett at [phone]. Patient is very anxious and would like to get a call from Dr. Vincenzo Bennett as soon as possible. Thank you.

## 2022-03-11 ENCOUNTER — HOSPITAL ENCOUNTER (OUTPATIENT)
Dept: CT IMAGING | Facility: HOSPITAL | Age: 72
Discharge: HOME | End: 2022-03-11
Attending: INTERNAL MEDICINE
Payer: MEDICARE

## 2022-03-11 DIAGNOSIS — C34.11 MALIGNANT NEOPLASM OF UPPER LOBE OF RIGHT LUNG (HCC): ICD-10-CM

## 2022-03-11 LAB — CREAT BLD-MCNC: 0.5 MG/DL

## 2022-03-11 PROCEDURE — 71260 CT THORAX DX C+: CPT | Performed by: INTERNAL MEDICINE

## 2022-03-11 PROCEDURE — 82565 ASSAY OF CREATININE: CPT

## 2022-03-14 ENCOUNTER — HOSPITAL ENCOUNTER (OUTPATIENT)
Dept: MAMMOGRAPHY | Age: 72
Discharge: HOME | End: 2022-03-14
Attending: INTERNAL MEDICINE
Payer: MEDICARE

## 2022-03-14 DIAGNOSIS — Z12.31 BREAST CANCER SCREENING BY MAMMOGRAM: ICD-10-CM

## 2022-03-14 PROCEDURE — 77067 SCR MAMMO BI INCL CAD: CPT | Performed by: INTERNAL MEDICINE

## 2022-03-14 PROCEDURE — 77063 BREAST TOMOSYNTHESIS BI: CPT | Performed by: INTERNAL MEDICINE

## 2022-03-15 ENCOUNTER — OFFICE VISIT (OUTPATIENT)
Dept: RADIATION ONCOLOGY | Facility: HOSPITAL | Age: 72
End: 2022-03-15
Attending: RADIOLOGY
Payer: MEDICARE

## 2022-03-15 ENCOUNTER — TELEPHONE (OUTPATIENT)
Dept: PULMONOLOGY | Facility: CLINIC | Age: 72
End: 2022-03-15

## 2022-03-15 VITALS
HEART RATE: 74 BPM | TEMPERATURE: 97 F | WEIGHT: 148 LBS | DIASTOLIC BLOOD PRESSURE: 86 MMHG | RESPIRATION RATE: 16 BRPM | OXYGEN SATURATION: 94 % | BODY MASS INDEX: 27 KG/M2 | SYSTOLIC BLOOD PRESSURE: 192 MMHG

## 2022-03-15 DIAGNOSIS — C34.11 MALIGNANT NEOPLASM OF UPPER LOBE OF RIGHT LUNG (HCC): Primary | ICD-10-CM

## 2022-03-15 PROCEDURE — 99211 OFF/OP EST MAY X REQ PHY/QHP: CPT

## 2022-03-15 NOTE — PATIENT INSTRUCTIONS
Dr. Kaye Jean will reach out to you regarding a plan. Call [phone] for radiation questions or concerns.

## 2022-03-16 NOTE — TELEPHONE ENCOUNTER
Arabella @ Endoscopy ([id number]) stts pt is scheduled for bronch & EBUS on 3/23 @ 10 am & pt to arrive at 9 am. She is aware of Dr. Sheila Olvera orders below r/t Plavix. Dr. Sorin Arroyo- do you want to order any testing preop?

## 2022-03-16 NOTE — TELEPHONE ENCOUNTER
Just make sure the patient stop Plavix 5 days prior to the procedure  Otherwise everything is  good  Thank you very much

## 2022-03-17 RX ORDER — PHENYTOIN SODIUM 100 MG/1
100 CAPSULE, EXTENDED RELEASE ORAL 3 TIMES DAILY
Qty: 270 CAPSULE | Refills: 3 | Status: SHIPPED | OUTPATIENT
Start: 2022-03-17

## 2022-03-17 NOTE — PROGRESS NOTES
Ander Diaz    PATIENT'S NAME: MAY, [address]   RADIATION ONCOLOGIST: Ana Christensen. Lata Jurado MD   PATIENT ACCOUNT #: [de-identified] LOCATION: [facility] RECORD #: [id number] YOB: 1950   FOLLOW-UP DATE: 03/15/2022       RADIATION ONCOLOGY FOLLOW-UP NOTE    REFERRING PHYSICIAN:  Syd Coy. 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    REGION TREATED:  Right apical lung tumor, 5000 cGy, completed 04/20/2020. INTERVAL SINCE COMPLETION OF RADIATION THERAPY:  One year and 11 months. PATIENT STATUS:  Probable regional recurrence. HISTORY:  The patient is a 53-year-old female who presents today for a followup visit. She has a history of a stage I clinical non-small cell carcinoma of the right upper lobe. She had significant COPD and been following with Dr. Sorin Arroyo. On a CT lung screen, there were some findings thought benign, for which followup was recommended. This followup, in September 2019, showed 2 adjacent right apical pulmonary nodules which had increased in size. A PET scan showed activity with an SUV of 3.1. She was not deemed a surgical candidate based upon various medical issues. Her imaging was repeated 3 months later and that showed an interval increase in conspicuity of the spiculated nodule in the right apex. There was no evidence of any hilar or mediastinal disease. She, therefore, deemed likely to have a primary bronchogenic malignancy and was a nonsurgical candidate. She was uninterested in biopsy and was therefore referred to Radiation Oncology for ablative radiotherapy. I gave 5000 cGy in 5 fractions completing on 04/20/2020. I have not seen the patient for followup in over 1 year. She apparently had been doing reasonably well, but imaging back in September 2021 revealed some change in the right anterior upper lobe as well as some new nodular foci in the right suprahilar region.   Also noted was a new 11 x 13 right paratracheal lymph node near the thoracic inlet. A PET scan was recommended and took place on 11/19/2021. This revealed increased uptake in several mediastinal lymph nodes. At that point, I recommended the patient to see Dr. Romana Pellant, who saw the patient and discussed the possibility of biopsy. She ultimately refused any biopsy and wanted to wait for a followup chest CT prior to making any additional decisions. This CT took place on 03/11/2022 and demonstrated that the right upper lobe opacity appears less prominent compared to the prior study, but the mediastinal lymphadenopathy has developed with at least 1 of the nodes corresponding to the hyperbolic lymph node seen on the prior PET scan. Additionally noted is a 3.7 cm low-density lesion in the prevascular mediastinum concerning for mediastinal lymphadenopathy. Additionally noted is a partially imaged low-density cystic-appearing right supraclavicular 4.9 cm structure which is increasing in size but had no activity on the prior PET/CT scan. This area was apparently biopsied at an outside institution and had shown some questionable findings that could be suspicious for malignancy, but no definitive diagnosis could be made. The patient essentially feels well but is quite anxious about her recent findings and had numerous questions. She denies any weight loss, changes in appetite, changes in breathing, hemoptysis, or other similar complaints. PHYSICAL EXAMINATION:    VITAL SIGNS:  On exam today, the patient is noted to have blood pressure of 192/86, pulse of 74, respiratory rate of 16, and a temperature of 97.2. She has a pain score of 0 and a weight of 148 pounds. NECK:  Supple with no lymphadenopathy. LUNGS:  Clear to auscultation bilaterally. HEART:  Regular rate and rhythm. Normal S1, S2, with no audible murmurs. LYMPHATICS:  There is no supraclavicular, axillary, or inguinal lymphadenopathy.   ABDOMEN: Benign. IMPRESSION AND RECOMMENDATIONS:  The patient clearly has some concerning findings on her most recent PET scan. As in November, I strongly recommend that we obtain biopsy. She had been reluctant to have the bronchoscopy and biopsy in the past but appears more amenable to do so after our discussion today. I will discuss the case further with Dr. Morales Soto. Assuming that the biopsy does prove positive, the patient may be a candidate for definitive chemoradiotherapy as treatment, likely with immunotherapy to follow. If the biopsy does not prove any malignant processes, then this certainly will need to be watched quite carefully in additional imaging. With respect to the PET cold lesion in the supraclavicular area, this is also concerning but appears to be a different process. I will discuss this further with Dr. Morales Soto and determine whether or not we wish to proceed with an additional biopsy or some type of excision if that is deemed appropriate. I spoke with Dr. Morales Soto earlier, and he indicated to me that he will be reaching out to the patient to schedule her for the EBUS procedure, and we will then proceed accordingly once the pathology has been ascertained. Thank you very much for allowing me the opportunity to participate in the care of this patient. If there should be any questions regarding the radiotherapy, please feel free to contact me at any time. Dictated By Kathryn Lewis MD  d: 03/16/2022 16:17:42  t: 03/17/2022 05:31:16  Job [id number]  NAD/    cc: Lexus Cox. Morales Soto MD   [address] [place] [address]  Bella Baker MD

## 2022-03-17 NOTE — TELEPHONE ENCOUNTER
Pt was informed of Dr. Juancarlos Robert orders below. She was given procedure date, time, arrival time, & instructions r/t procedure. Reassurance provided. Pt voiced understanding.

## 2022-03-17 NOTE — TELEPHONE ENCOUNTER
Please review; protocol failed.  Or has no protocol    Requested Prescriptions   Pending Prescriptions Disp Refills    PHENYTOIN  MG Oral Cap [Pharmacy Med Name: PHENYTOIN SODIUM 100MG EXT CAPSULES] 270 capsule 3     Sig: TAKE 1 CAPSULE(100 MG) BY MOUTH THREE TIMES DAILY        Neurology Medications Passed - 3/16/2022  7:58 PM        Passed - Appointment in the past 6 or next 3 months               Recent Outpatient Visits              Yesterday Malignant neoplasm of upper lobe of right lung ([facility] [address]    Karen Persaud MD    Office Visit    1 month ago Malignant neoplasm of upper lobe of right lung [facility])    Yvonne, [address] [facility], Haris Sue MD    Office Visit    2 months ago Essential hypertension, [facility], [address], [place] Srinivas Barry MD    Office Visit    3 months ago Carotid artery disease without cerebral infarction [facility])    Vascular - Dana Jacques MD    Office Visit    5 months ago Essential hypertension, benign    [facility], [facility], [address], MD Donta    Office Visit

## 2022-03-18 ENCOUNTER — TELEPHONE (OUTPATIENT)
Dept: PULMONOLOGY | Facility: CLINIC | Age: 72
End: 2022-03-18

## 2022-03-18 NOTE — TELEPHONE ENCOUNTER
Spoke to patient who is OK moving procedure to 8am on 3/23/22. Patient will arrive at 7 am. Instructions remain the same. Patient informed to call Pulmo with any further questions before procedure date.     Dr. Leslie Burgos

## 2022-03-20 ENCOUNTER — LAB ENCOUNTER (OUTPATIENT)
Dept: LAB | Facility: HOSPITAL | Age: 72
End: 2022-03-20
Attending: INTERNAL MEDICINE
Payer: MEDICARE

## 2022-03-20 DIAGNOSIS — Z01.818 PRE-OP TESTING: ICD-10-CM

## 2022-03-20 LAB — SARS-COV-2 RNA RESP QL NAA+PROBE: NOT DETECTED

## 2022-03-23 ENCOUNTER — HOSPITAL ENCOUNTER (OUTPATIENT)
Facility: HOSPITAL | Age: 72
Setting detail: HOSPITAL OUTPATIENT SURGERY
Discharge: HOME | End: 2022-03-23
Attending: INTERNAL MEDICINE | Admitting: INTERNAL MEDICINE
Payer: MEDICARE

## 2022-03-23 ENCOUNTER — ANESTHESIA EVENT (OUTPATIENT)
Dept: ENDOSCOPY | Facility: HOSPITAL | Age: 72
End: 2022-03-23
Payer: MEDICARE

## 2022-03-23 ENCOUNTER — ANESTHESIA (OUTPATIENT)
Dept: ENDOSCOPY | Facility: HOSPITAL | Age: 72
End: 2022-03-23
Payer: MEDICARE

## 2022-03-23 VITALS
SYSTOLIC BLOOD PRESSURE: 141 MMHG | RESPIRATION RATE: 22 BRPM | DIASTOLIC BLOOD PRESSURE: 66 MMHG | HEIGHT: 62 IN | BODY MASS INDEX: 27.23 KG/M2 | HEART RATE: 79 BPM | WEIGHT: 148 LBS | TEMPERATURE: 98 F | OXYGEN SATURATION: 98 %

## 2022-03-23 DIAGNOSIS — Z01.818 PRE-OP TESTING: Primary | ICD-10-CM

## 2022-03-23 DIAGNOSIS — C34.11 MALIGNANT NEOPLASM OF UPPER LOBE OF RIGHT LUNG (HCC): ICD-10-CM

## 2022-03-23 PROCEDURE — 07D78ZX EXTRACTION OF THORAX LYMPHATIC, VIA NATURAL OR ARTIFICIAL OPENING ENDOSCOPIC, DIAGNOSTIC: ICD-10-PCS | Performed by: INTERNAL MEDICINE

## 2022-03-23 PROCEDURE — 31652 BRONCH EBUS SAMPLNG 1/2 NODE: CPT | Performed by: INTERNAL MEDICINE

## 2022-03-23 RX ORDER — SODIUM CHLORIDE, SODIUM LACTATE, POTASSIUM CHLORIDE, CALCIUM CHLORIDE 600; 310; 30; 20 MG/100ML; MG/100ML; MG/100ML; MG/100ML
INJECTION, SOLUTION INTRAVENOUS CONTINUOUS
Status: DISCONTINUED | OUTPATIENT
Start: 2022-03-23 | End: 2022-03-23

## 2022-03-23 RX ORDER — DEXAMETHASONE SODIUM PHOSPHATE 4 MG/ML
VIAL (ML) INJECTION AS NEEDED
Status: DISCONTINUED | OUTPATIENT
Start: 2022-03-23 | End: 2022-03-23 | Stop reason: SURG

## 2022-03-23 RX ORDER — HYDROCODONE BITARTRATE AND ACETAMINOPHEN 5; 325 MG/1; MG/1
2 TABLET ORAL AS NEEDED
Status: DISCONTINUED | OUTPATIENT
Start: 2022-03-23 | End: 2022-03-23

## 2022-03-23 RX ORDER — PROCHLORPERAZINE EDISYLATE 5 MG/ML
5 INJECTION INTRAMUSCULAR; INTRAVENOUS ONCE AS NEEDED
Status: DISCONTINUED | OUTPATIENT
Start: 2022-03-23 | End: 2022-03-23

## 2022-03-23 RX ORDER — PHENYLEPHRINE HCL 10 MG/ML
VIAL (ML) INJECTION AS NEEDED
Status: DISCONTINUED | OUTPATIENT
Start: 2022-03-23 | End: 2022-03-23 | Stop reason: SURG

## 2022-03-23 RX ORDER — HYDROMORPHONE HYDROCHLORIDE 1 MG/ML
0.6 INJECTION, SOLUTION INTRAMUSCULAR; INTRAVENOUS; SUBCUTANEOUS EVERY 5 MIN PRN
Status: DISCONTINUED | OUTPATIENT
Start: 2022-03-23 | End: 2022-03-23

## 2022-03-23 RX ORDER — ALBUTEROL SULFATE 90 UG/1
AEROSOL, METERED RESPIRATORY (INHALATION) AS NEEDED
Status: DISCONTINUED | OUTPATIENT
Start: 2022-03-23 | End: 2022-03-23 | Stop reason: SURG

## 2022-03-23 RX ORDER — MORPHINE SULFATE 4 MG/ML
4 INJECTION, SOLUTION INTRAMUSCULAR; INTRAVENOUS EVERY 10 MIN PRN
Status: DISCONTINUED | OUTPATIENT
Start: 2022-03-23 | End: 2022-03-23

## 2022-03-23 RX ORDER — ONDANSETRON 2 MG/ML
4 INJECTION INTRAMUSCULAR; INTRAVENOUS ONCE AS NEEDED
Status: DISCONTINUED | OUTPATIENT
Start: 2022-03-23 | End: 2022-03-23

## 2022-03-23 RX ORDER — METOPROLOL TARTRATE 5 MG/5ML
2.5 INJECTION INTRAVENOUS ONCE
Status: DISCONTINUED | OUTPATIENT
Start: 2022-03-23 | End: 2022-03-23

## 2022-03-23 RX ORDER — HYDROMORPHONE HYDROCHLORIDE 1 MG/ML
0.2 INJECTION, SOLUTION INTRAMUSCULAR; INTRAVENOUS; SUBCUTANEOUS EVERY 5 MIN PRN
Status: DISCONTINUED | OUTPATIENT
Start: 2022-03-23 | End: 2022-03-23

## 2022-03-23 RX ORDER — HALOPERIDOL 5 MG/ML
0.25 INJECTION INTRAMUSCULAR ONCE AS NEEDED
Status: DISCONTINUED | OUTPATIENT
Start: 2022-03-23 | End: 2022-03-23

## 2022-03-23 RX ORDER — MORPHINE SULFATE 4 MG/ML
2 INJECTION, SOLUTION INTRAMUSCULAR; INTRAVENOUS EVERY 10 MIN PRN
Status: DISCONTINUED | OUTPATIENT
Start: 2022-03-23 | End: 2022-03-23

## 2022-03-23 RX ORDER — HYDROCODONE BITARTRATE AND ACETAMINOPHEN 5; 325 MG/1; MG/1
1 TABLET ORAL AS NEEDED
Status: DISCONTINUED | OUTPATIENT
Start: 2022-03-23 | End: 2022-03-23

## 2022-03-23 RX ORDER — LIDOCAINE HYDROCHLORIDE 40 MG/ML
SOLUTION TOPICAL AS NEEDED
Status: DISCONTINUED | OUTPATIENT
Start: 2022-03-23 | End: 2022-03-23 | Stop reason: SURG

## 2022-03-23 RX ORDER — MORPHINE SULFATE 10 MG/ML
6 INJECTION, SOLUTION INTRAMUSCULAR; INTRAVENOUS EVERY 10 MIN PRN
Status: DISCONTINUED | OUTPATIENT
Start: 2022-03-23 | End: 2022-03-23

## 2022-03-23 RX ORDER — EPHEDRINE SULFATE 50 MG/ML
INJECTION, SOLUTION INTRAVENOUS AS NEEDED
Status: DISCONTINUED | OUTPATIENT
Start: 2022-03-23 | End: 2022-03-23 | Stop reason: SURG

## 2022-03-23 RX ORDER — ONDANSETRON 2 MG/ML
INJECTION INTRAMUSCULAR; INTRAVENOUS AS NEEDED
Status: DISCONTINUED | OUTPATIENT
Start: 2022-03-23 | End: 2022-03-23 | Stop reason: SURG

## 2022-03-23 RX ORDER — GLYCOPYRROLATE 0.2 MG/ML
INJECTION, SOLUTION INTRAMUSCULAR; INTRAVENOUS AS NEEDED
Status: DISCONTINUED | OUTPATIENT
Start: 2022-03-23 | End: 2022-03-23 | Stop reason: SURG

## 2022-03-23 RX ORDER — LIDOCAINE HYDROCHLORIDE 10 MG/ML
INJECTION, SOLUTION EPIDURAL; INFILTRATION; INTRACAUDAL; PERINEURAL AS NEEDED
Status: DISCONTINUED | OUTPATIENT
Start: 2022-03-23 | End: 2022-03-23 | Stop reason: SURG

## 2022-03-23 RX ORDER — HYDROMORPHONE HYDROCHLORIDE 1 MG/ML
0.4 INJECTION, SOLUTION INTRAMUSCULAR; INTRAVENOUS; SUBCUTANEOUS EVERY 5 MIN PRN
Status: DISCONTINUED | OUTPATIENT
Start: 2022-03-23 | End: 2022-03-23

## 2022-03-23 RX ORDER — ROCURONIUM BROMIDE 10 MG/ML
INJECTION, SOLUTION INTRAVENOUS AS NEEDED
Status: DISCONTINUED | OUTPATIENT
Start: 2022-03-23 | End: 2022-03-23 | Stop reason: SURG

## 2022-03-23 RX ORDER — NALOXONE HYDROCHLORIDE 0.4 MG/ML
80 INJECTION, SOLUTION INTRAMUSCULAR; INTRAVENOUS; SUBCUTANEOUS AS NEEDED
Status: DISCONTINUED | OUTPATIENT
Start: 2022-03-23 | End: 2022-03-23

## 2022-03-23 RX ADMIN — ONDANSETRON 4 MG: 2 INJECTION INTRAMUSCULAR; INTRAVENOUS at 08:57:00

## 2022-03-23 RX ADMIN — LIDOCAINE HYDROCHLORIDE 50 MG: 10 INJECTION, SOLUTION EPIDURAL; INFILTRATION; INTRACAUDAL; PERINEURAL at 08:32:00

## 2022-03-23 RX ADMIN — EPHEDRINE SULFATE 5 MG: 50 INJECTION, SOLUTION INTRAVENOUS at 08:47:00

## 2022-03-23 RX ADMIN — EPHEDRINE SULFATE 5 MG: 50 INJECTION, SOLUTION INTRAVENOUS at 09:07:00

## 2022-03-23 RX ADMIN — PHENYLEPHRINE HCL 100 MCG: 10 MG/ML VIAL (ML) INJECTION at 08:47:00

## 2022-03-23 RX ADMIN — EPHEDRINE SULFATE 7.5 MG: 50 INJECTION, SOLUTION INTRAVENOUS at 08:43:00

## 2022-03-23 RX ADMIN — LIDOCAINE HYDROCHLORIDE 4 ML: 40 SOLUTION TOPICAL at 08:35:00

## 2022-03-23 RX ADMIN — SODIUM CHLORIDE, SODIUM LACTATE, POTASSIUM CHLORIDE, CALCIUM CHLORIDE: 600; 310; 30; 20 INJECTION, SOLUTION INTRAVENOUS at 09:30:00

## 2022-03-23 RX ADMIN — PHENYLEPHRINE HCL 100 MCG: 10 MG/ML VIAL (ML) INJECTION at 09:07:00

## 2022-03-23 RX ADMIN — SODIUM CHLORIDE, SODIUM LACTATE, POTASSIUM CHLORIDE, CALCIUM CHLORIDE: 600; 310; 30; 20 INJECTION, SOLUTION INTRAVENOUS at 08:28:00

## 2022-03-23 RX ADMIN — PHENYLEPHRINE HCL 100 MCG: 10 MG/ML VIAL (ML) INJECTION at 08:52:00

## 2022-03-23 RX ADMIN — ALBUTEROL SULFATE 2 PUFF: 90 AEROSOL, METERED RESPIRATORY (INHALATION) at 08:29:00

## 2022-03-23 RX ADMIN — GLYCOPYRROLATE 0.1 MG: 0.2 INJECTION, SOLUTION INTRAMUSCULAR; INTRAVENOUS at 08:33:00

## 2022-03-23 RX ADMIN — EPHEDRINE SULFATE 5 MG: 50 INJECTION, SOLUTION INTRAVENOUS at 08:58:00

## 2022-03-23 RX ADMIN — ALBUTEROL SULFATE 2 PUFF: 90 AEROSOL, METERED RESPIRATORY (INHALATION) at 09:39:00

## 2022-03-23 RX ADMIN — ROCURONIUM BROMIDE 2 MG: 10 INJECTION, SOLUTION INTRAVENOUS at 08:32:00

## 2022-03-23 RX ADMIN — DEXAMETHASONE SODIUM PHOSPHATE 8 MG: 4 MG/ML VIAL (ML) INJECTION at 08:38:00

## 2022-03-23 RX ADMIN — GLYCOPYRROLATE 0.1 MG: 0.2 INJECTION, SOLUTION INTRAMUSCULAR; INTRAVENOUS at 09:07:00

## 2022-03-23 NOTE — ANESTHESIA PROCEDURE NOTES
Airway  Date/Time: 3/23/2022 8:35 AM  Urgency: Elective      General Information and Staff    Patient location during procedure: OR  Anesthesiologist: Nicholos Kayser, MD  Resident/CRNA: Melissa Garner CRNA  Performed: CRNA     Indications and Patient Condition  Indications for airway management: anesthesia  Sedation level: deep  Preoxygenated: yes  Patient position: sniffing  Mask difficulty assessment: 1 - vent by mask    Final Airway Details  Final airway type: endotracheal airway      Successful airway: ETT  Cuffed: yes   Successful intubation technique: direct laryngoscopy  Endotracheal tube insertion site: oral  Blade: GlideScope  Blade size: #3  ETT size (mm): 8.0    Cormack-Lehane Classification: grade I - full view of glottis  Placement verified by: chest auscultation and capnometry   Measured from: lips  ETT to lips (cm): 21  Number of attempts at approach: 1

## 2022-03-23 NOTE — ANESTHESIA POSTPROCEDURE EVALUATION
Patient: Brianda Macias    Procedure Summary     Date: 03/23/22 Room / Location: [address] [place] [address] ENDOSCOPY 05 / [address] [place] [address] ENDOSCOPY    Anesthesia Start: 3648 Anesthesia Stop: 1709    Procedures:       BRONCHOSCOPY (N/A )      ENDOBRONCHIAL ULTRASOUND (EBUS) (N/A ) Diagnosis:       Malignant neoplasm of upper lobe of right lung (HCC)      (Malignant neoplasm of upper lobe of right lung [facility]))    Surgeons: Ramone Bhatti MD Anesthesiologist: Amish Correa MD    Anesthesia Type: general ASA Status: 4          Anesthesia Type: general    Vitals Value Taken Time   /72 03/23/22 0957   Temp 97.7 03/23/22 0957   Pulse 82 03/23/22 0957   Resp 14 03/23/22 0957   SpO2 95 03/23/22 0957       [facility] AN Post Evaluation:   Patient Evaluated in PACU  Patient Participation: complete - patient participated  Level of Consciousness: awake  Pain Score: 0  Pain Management: adequate  Airway Patency:patent  Dental exam unchanged from preop  Yes    Cardiovascular Status: acceptable  Respiratory Status: acceptable  Postoperative Hydration acceptable      Mildred Marion CRNA  3/23/2022 9:57 AM

## 2022-03-28 ENCOUNTER — TELEPHONE (OUTPATIENT)
Dept: PULMONOLOGY | Facility: CLINIC | Age: 72
End: 2022-03-28

## 2022-03-28 NOTE — TELEPHONE ENCOUNTER
Called the patient and informed her that the result of the EBUS bronchoscopy was negative for malignancy  Patient to follow-up with me in 6 to 8 weeks

## 2022-03-28 NOTE — TELEPHONE ENCOUNTER
Spoke to patient and appointment scheduled in 6 weeks as requested by Dr. Pipe Howard. No further action required at this time.

## 2022-03-28 NOTE — TELEPHONE ENCOUNTER
Dr. Megan Hernandez, patient requesting results regarding Bronchoscopy cytology fluids done on 3/23/22. Please advise. Thank you.

## 2022-03-31 NOTE — TELEPHONE ENCOUNTER
Refill passed per [address] [place] protocol.      Requested Prescriptions   Pending Prescriptions Disp Refills    Kavya Houser 52 200-5 MCG/ACT Inhalation Aerosol Jhonatan Med Name: Kavya [address] 200-5MCG ORAL INHALER 120INH] 39 g 3     Sig: USE TWO PUFFS TWICE DAILY        Asthma & COPD Medication Protocol Passed - 3/30/2022  5:27 PM        Passed - Appointment in past 6 or next 3 months                Recent Outpatient Visits              2 weeks ago Malignant neoplasm of upper lobe of right lung ([address]    Marcellus Shepherd MD    Office Visit    1 month ago Malignant neoplasm of upper lobe of right lung [facility])    Yvonne [address] [place] [address], Marsha oRck MD    Office Visit    2 months ago Essential hypertension, benign    [facility], [address], [address] Lilia Hensley MD    Office Visit    3 months ago Carotid artery disease without cerebral infarction [facility])    Vascular - [address], Terrell Coy MD    Office Visit    6 months ago Essential hypertension, benign    [address] [place], [address], MD [place]    Office Visit             Future Appointments         Provider Department Appt Notes    In 1 week Lilia Hensley MD [address], [address] 6 month f/u    In 1 month Osmani Hall MD [place] [address] [place] [address] f/u

## 2022-04-08 ENCOUNTER — OFFICE VISIT (OUTPATIENT)
Dept: INTERNAL MEDICINE CLINIC | Facility: CLINIC | Age: 72
End: 2022-04-08
Payer: MEDICARE

## 2022-04-08 ENCOUNTER — TELEPHONE (OUTPATIENT)
Dept: INTERNAL MEDICINE CLINIC | Facility: CLINIC | Age: 72
End: 2022-04-08

## 2022-04-08 VITALS
HEIGHT: 62 IN | HEART RATE: 80 BPM | SYSTOLIC BLOOD PRESSURE: 130 MMHG | DIASTOLIC BLOOD PRESSURE: 82 MMHG | WEIGHT: 143 LBS | TEMPERATURE: 98 F | BODY MASS INDEX: 26.31 KG/M2

## 2022-04-08 DIAGNOSIS — R73.9 HYPERGLYCEMIA: ICD-10-CM

## 2022-04-08 DIAGNOSIS — I10 ESSENTIAL HYPERTENSION, BENIGN: Primary | ICD-10-CM

## 2022-04-08 DIAGNOSIS — M41.26 OTHER IDIOPATHIC SCOLIOSIS, LUMBAR REGION: ICD-10-CM

## 2022-04-08 DIAGNOSIS — S39.012D STRAIN OF LUMBAR REGION, SUBSEQUENT ENCOUNTER: ICD-10-CM

## 2022-04-08 DIAGNOSIS — J43.1 PANLOBULAR EMPHYSEMA (HCC): ICD-10-CM

## 2022-04-08 DIAGNOSIS — E78.00 PURE HYPERCHOLESTEROLEMIA: ICD-10-CM

## 2022-04-08 DIAGNOSIS — R56.9 SEIZURE (HCC): ICD-10-CM

## 2022-04-08 PROBLEM — S39.012A STRAIN OF LUMBAR REGION: Status: ACTIVE | Noted: 2022-01-01

## 2022-04-08 PROBLEM — S39.012A STRAIN OF LUMBAR REGION: Status: ACTIVE | Noted: 2022-04-08

## 2022-04-08 PROCEDURE — 99214 OFFICE O/P EST MOD 30 MIN: CPT | Performed by: INTERNAL MEDICINE

## 2022-04-08 RX ORDER — HYDROCODONE BITARTRATE AND ACETAMINOPHEN 5; 325 MG/1; MG/1
1 TABLET ORAL EVERY 8 HOURS PRN
Qty: 90 TABLET | Refills: 0 | Status: SHIPPED | OUTPATIENT
Start: 2022-04-08

## 2022-04-08 NOTE — ASSESSMENT & PLAN NOTE
Worsening of chronic low back pain , have xray , change tramadol to Norco 5 mg , beware of constipation , See physiatry .

## 2022-04-08 NOTE — TELEPHONE ENCOUNTER
Pt calling about her appointment today, states she will be arriving around 11:30 and wanted to let office know that she will be needing a wheelchair for her visit.

## 2022-04-18 ENCOUNTER — TELEPHONE (OUTPATIENT)
Dept: INTERNAL MEDICINE CLINIC | Facility: CLINIC | Age: 72
End: 2022-04-18

## 2022-04-18 RX ORDER — TRAMADOL HYDROCHLORIDE 50 MG/1
50 TABLET ORAL EVERY 6 HOURS PRN
Qty: 360 TABLET | Refills: 1 | Status: CANCELLED | OUTPATIENT
Start: 2022-04-18

## 2022-04-18 NOTE — TELEPHONE ENCOUNTER
NA upon calling pt, unable to fill tramadol on call, pt must wait till pcp returns, can take norco or half of it

## 2022-04-18 NOTE — TELEPHONE ENCOUNTER
Patient aware Dr Pro Segura out of office until 4/21/22. Patient has enough tramadol until 4/27/22. She will await Dr Pro Segura for refill.

## 2022-04-18 NOTE — TELEPHONE ENCOUNTER
Patient called asking if  can refill her Tramadol. Patient aware Vin Patton is out of the office until Friday 4/22/22. Patient states she was prescribed Norco,but did not take it because she is afraid it will be too strong.

## 2022-04-18 NOTE — TELEPHONE ENCOUNTER
Patient contacted. Patient informed of On call provider's response. Patient understood and also realized she has enough tramadol until 4/27/22. She will f/u with Dr Raiza Meza for refill.

## 2022-04-22 NOTE — TELEPHONE ENCOUNTER
Patient states she was having trouble breathing yesterday and was admitted to the hospital. Patient is still at the hospital and just wanted to have Dr. Sanchez Letters aware.

## 2022-04-22 NOTE — TELEPHONE ENCOUNTER
Patient requesting to know if Dr. Kiara Hull is back to refill her medication. Patient was advised message will be sent for refill.

## 2022-04-25 NOTE — TELEPHONE ENCOUNTER
Patient returns call to office and provides the following update: To be discharged from hospital today. 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

## 2022-04-26 RX ORDER — TRAMADOL HYDROCHLORIDE 50 MG/1
50 TABLET ORAL EVERY 4 HOURS PRN
Qty: 180 TABLET | Refills: 1 | Status: SHIPPED | OUTPATIENT
Start: 2022-04-26

## 2022-05-17 RX ORDER — VENLAFAXINE HYDROCHLORIDE 150 MG/1
CAPSULE, EXTENDED RELEASE ORAL
Qty: 90 CAPSULE | Refills: 3 | OUTPATIENT
Start: 2022-05-17

## 2022-06-20 RX ORDER — IRBESARTAN 300 MG/1
TABLET ORAL
Qty: 90 TABLET | Refills: 3 | OUTPATIENT
Start: 2022-06-20

## 2025-03-19 NOTE — TELEPHONE ENCOUNTER
Refill entered and routed to Dr Rupali Calhoun ADL retraining/balance training/bed mobility training/transfer training

## 2025-04-09 NOTE — ASSESSMENT & PLAN NOTE
SEE PULMONARY FOR TESTING TO VERIFY OXYGEN NEED. Admission Reconciliation is Completed  Discharge Reconciliation is Completed

## (undated) DIAGNOSIS — C34.11 MALIGNANT NEOPLASM OF UPPER LOBE OF RIGHT LUNG (HCC): Primary | ICD-10-CM

## (undated) DEVICE — CONMED SCOPE SAVER BITE BLOCK, 20X27 MM: Brand: SCOPE SAVER

## (undated) DEVICE — 3 ML SYRINGE LUER-LOCK TIP: Brand: MONOJECT

## (undated) DEVICE — SNARE CAPTIFLEX MICRO-OVL OLY

## (undated) DEVICE — MASK PROC MASK SOFT WHITE

## (undated) DEVICE — SINGLE USE BIOPSY VALVE MAJ-210: Brand: SINGLE USE BIOPSY VALVE (STERILE)

## (undated) DEVICE — MEDI-VAC NON-CONDUCTIVE SUCTION TUBING: Brand: CARDINAL HEALTH

## (undated) DEVICE — 6 ML SYRINGE LUER-LOCK TIP: Brand: MONOJECT

## (undated) DEVICE — NDL ASP 21GA 2MM STRL DISP

## (undated) DEVICE — SINGLE USE SUCTION VALVE MAJ-209: Brand: SINGLE USE SUCTION VALVE (STERILE)

## (undated) DEVICE — YANKAUER SUCTION INSTRUMENT NO CONTROL VENT, BULB TIP, CLEAR: Brand: YANKAUER

## (undated) DEVICE — TRAP 4 CPTR CHMBR N EZ INLN

## (undated) DEVICE — Device: Brand: DEFENDO AIR/WATER/SUCTION AND BIOPSY VALVE

## (undated) DEVICE — Device: Brand: BALLOON

## (undated) DEVICE — CONTAINER CLIKSEAL PP 4OZ BLU

## (undated) DEVICE — SYRINGE 10ML SLIP TIP

## (undated) DEVICE — LINE MNTR ADLT SET O2 INTMD

## (undated) DEVICE — ADAPTER BRONCHOSCOPE SWIVEL

## (undated) DEVICE — 35 ML SYRINGE REGULAR TIP: Brand: MONOJECT

## (undated) DEVICE — KIT CLEAN ENDOKIT 1.1OZ GOWNX2

## (undated) DEVICE — AIRLIFE™ MISTY FAST™ SMALL VOLUME NEBULIZER WITH 7 FOOT (2.1 M) CRUSH RESISTANT OXYGEN TUBING, BAFFLED MOUTHPIECE, AND 6 INCH (15 CM) FLEXTUBE: Brand: AIRLIFE™

## (undated) DEVICE — ENDOSCOPY PACK - LOWER: Brand: MEDLINE INDUSTRIES, INC.

## (undated) DEVICE — FORCEP RADIAL JAW 4

## (undated) NOTE — MR AVS SNAPSHOT
[facility] SPECIALTY [facility] [address] [address]  [phone]               Thank you for choosing us for your health care visit with Le Meadows MD.  We are glad to serve you and happy to provide you with this summary of y Commonly known as:  XANAX           carvedilol 25 MG Tabs   TAKE 1 TABLET BY MOUTH TWICE A DAY WITH FOOD   Commonly known as:  COREG           Clopidogrel Bisulfate 75 MG Tabs   TAKE 1 TABLET BY MOUTH ONCE DAILY.    Commonly known as:  Shima Many Expires: 7/1/2017  9:22 AM    If you have questions, you can call [phone] to talk to our [facility] Staff. Remember, [employer] is NOT to be used for urgent needs. For medical emergencies, dial 911.            Visit [email or web address] online a

## (undated) NOTE — LETTER
8/21/2018              Therese HWANG May        [address] [facility]          Dear Christi Pinto,    I wanted to get back to you with your colonoscopy results. You had 5 colon polyps removed which were benign.   I would advise a repeat colonos

## (undated) NOTE — MR AVS SNAPSHOT
[facility] [address] [address]  [phone]               Thank you for choosing us for your health care visit with Arnaud Gomez MD.  We are glad to serve you and happy to provide you with this summary of y Commonly known as:  PLAVIX           DULERA 200-5 MCG/ACT Aero   Generic drug:  Mometasone Furo-Formoterol Fum   INHALE 2 PUFFS BY MOUTH TWICE A DAY IN THE MORNING AND EVENING. What changed:  Another medication with the same name was removed.  Continue ta visit,  view other health information, and more. To sign up or find more information, go to [email or web address] and click on the Sign Up Now link in the [employer] box.      Enter your [employer] Activation Code exactly as it appears below along with yo Don’t forget strength training with weights and resistance Set goals and track your progress   You don’t need to join a gym. Home exercises work great.  Put more priority on exercise in your life                    Visit [facility] online at

## (undated) NOTE — LETTER
JONI LUTZ  [address] [place] [address]  Maverick Oliveira [address]      8/1/2019    Dear Lanre Mosquera,    It has come to our attention that you are due for Ct chest.    This test was ordered by Dr Elex Pallas.         If you are allergic to iodine or have any questions, please

## (undated) NOTE — LETTER
6/1/2021    Lupe Curry May        [address] [place]            Dear Lupe Curry May,      Our records indicate that you are due for an appointment for a Colonoscopy in August 2021, or shortly there after, with Simi Retana MD.    Enoch

## (undated) NOTE — MR AVS SNAPSHOT
After Visit Summary   4/20/2020    Richard Hatfield May    MRN: [id number]           Visit Information     Date & Time  4/20/2020 11:00 AM Provider  MD Marielena Mercer 0 Dept.  Phone  [phone] 7/13/2020 1:30 PM [facility] OF THE [facility] [place] [address] [facility] now offers Video Visits through [address] for adult and pediatric patients.   Video Visits are available Monday - Friday for many common conditions such as allergies, colds, cough, Monday – Friday  10:00 am – 10:00 pm   Saturday – Sunday  10:00 am – 4:00 pm     [address]   Monday – Friday  4:00 pm – 10:00 pm   Saturday – Sunday  10:00 am – 4:00 pm  WALK-IN CARE  Emergency Medicine Providers  Conditions needing urgent attention, but